# Patient Record
Sex: MALE | Race: OTHER | NOT HISPANIC OR LATINO
[De-identification: names, ages, dates, MRNs, and addresses within clinical notes are randomized per-mention and may not be internally consistent; named-entity substitution may affect disease eponyms.]

---

## 2020-02-07 PROBLEM — Z00.00 ENCOUNTER FOR PREVENTIVE HEALTH EXAMINATION: Status: ACTIVE | Noted: 2020-02-07

## 2020-02-10 ENCOUNTER — APPOINTMENT (OUTPATIENT)
Dept: HEMATOLOGY ONCOLOGY | Facility: CLINIC | Age: 60
End: 2020-02-10
Payer: COMMERCIAL

## 2020-02-10 ENCOUNTER — TRANSCRIPTION ENCOUNTER (OUTPATIENT)
Age: 60
End: 2020-02-10

## 2020-02-10 VITALS
OXYGEN SATURATION: 95 % | RESPIRATION RATE: 18 BRPM | HEIGHT: 67 IN | DIASTOLIC BLOOD PRESSURE: 86 MMHG | TEMPERATURE: 97.6 F | WEIGHT: 215 LBS | BODY MASS INDEX: 33.74 KG/M2 | SYSTOLIC BLOOD PRESSURE: 131 MMHG | HEART RATE: 75 BPM

## 2020-02-10 DIAGNOSIS — Z78.9 OTHER SPECIFIED HEALTH STATUS: ICD-10-CM

## 2020-02-10 DIAGNOSIS — Z87.09 PERSONAL HISTORY OF OTHER DISEASES OF THE RESPIRATORY SYSTEM: ICD-10-CM

## 2020-02-10 DIAGNOSIS — K51.20 ULCERATIVE (CHRONIC) PROCTITIS W/OUT COMPLICATIONS: ICD-10-CM

## 2020-02-10 DIAGNOSIS — R79.89 OTHER SPECIFIED ABNORMAL FINDINGS OF BLOOD CHEMISTRY: ICD-10-CM

## 2020-02-10 DIAGNOSIS — Z86.19 PERSONAL HISTORY OF OTHER INFECTIOUS AND PARASITIC DISEASES: ICD-10-CM

## 2020-02-10 PROCEDURE — 99205 OFFICE O/P NEW HI 60 MIN: CPT

## 2020-02-10 NOTE — ASSESSMENT
[FreeTextEntry1] : This is a very pleasant 60-year-old gentleman who has recently been diagnosed with an erythrocytosis. There are a number of factors that suggests that this may be a reactive process, including the use of testosterone, however, at this time I am unable to rule out a proliferative process.   He may have been keeping his blood counts lowered with his regular donation schedule and therefore making a proliferative process less apparent until now.   I will obtain a CBC with differential and a peripheral blood smear review.  I will obtain a complete SILVIANO-2 mutation analysis panel.   Erythropoietin levels will be checked today.   A full hematological workup will be sent at today's office visit.  He will return in 2 weeks for followup, labs, and further recommendations based on the above workup.\par \par Thank you very much for allowing to participate in this gentleman is medical care.  Should you have any questions or concerns please do not hesitate to call me directly.

## 2020-03-03 ENCOUNTER — APPOINTMENT (OUTPATIENT)
Dept: HEMATOLOGY ONCOLOGY | Facility: CLINIC | Age: 60
End: 2020-03-03
Payer: COMMERCIAL

## 2020-03-03 VITALS
DIASTOLIC BLOOD PRESSURE: 89 MMHG | BODY MASS INDEX: 34.21 KG/M2 | WEIGHT: 218 LBS | OXYGEN SATURATION: 96 % | HEART RATE: 83 BPM | TEMPERATURE: 97.2 F | RESPIRATION RATE: 18 BRPM | HEIGHT: 67 IN | SYSTOLIC BLOOD PRESSURE: 140 MMHG

## 2020-03-03 PROCEDURE — 99214 OFFICE O/P EST MOD 30 MIN: CPT

## 2020-03-03 NOTE — ASSESSMENT
[FreeTextEntry1] : This is a very pleasant 60-year-old gentleman who has recently been diagnosed with an erythrocytosis. There are a number of factors that suggests that this may be a reactive process, including the use of testosterone, however, at this time I am unable to rule out a proliferative process.   He may have been keeping his blood counts lowered with his regular donation schedule and therefore making a proliferative process less apparent until now.   I obtained a CBC with differential and a peripheral blood smear review.  I obtained a complete SILVIANO-2 mutation analysis panel.   Erythropoietin levels were checked .  A full hematological workup was sent.   This was largely unremarkable but he continues to show erythrocytosis.  Given that there is no obvious evidence of a proliferative process that it is reasonable to stop his testosterone treatment to see if this is the causative agent.  Testosterone will now be on hold.  He is scheduled for a colonoscopy later this month.  The positive EDISON will be repeated today and if still positive I have asked him to see Rheum.  He will return in one month for f/u with repeat labs.

## 2020-03-03 NOTE — HISTORY OF PRESENT ILLNESS
[0 - No Distress] : Distress Level: 0 [de-identified] : This is a very pleasant 60 year-old gentleman with the below past medical history who has recently been found to have an erythrocytosis.  This was confirmed on repeat blood testing. He is unaware of a similar prior diagnosis.  He has been regularly donating blood but was told that his counts were too high when he tried to donate about 2 months ago.  He also reports having low testosterone and receiving testosterone injections for her last 2-3 years.   He has now been referred for further hematological evaluation of this.   He denies fevers, chills, loss of appetite or weight, lightheadedness/dizziness, chest pain, chest palpitations, shortness of breath, abdominal pain, nausea, vomiting, diarrhea, signs of blood loss including melena, new lower extremity swelling pain, new rashes/ecchymoses/petechiae.  He does report that he has experienced regular night sweats for the last 9 months, however, they have not been drenching and he has maintained his weight and appetite.  His last colonoscopy was approximately 1 year ago performed at [facility] that he reports showed no specific pathology in that his ulcerative colitis was in remission. [de-identified] : No new complaints.

## 2020-03-31 ENCOUNTER — APPOINTMENT (OUTPATIENT)
Dept: HEMATOLOGY ONCOLOGY | Facility: CLINIC | Age: 60
End: 2020-03-31

## 2020-05-04 PROBLEM — R76.8 ANA POSITIVE: Status: ACTIVE | Noted: 2020-03-03

## 2020-05-04 PROBLEM — D45 PV (POLYCYTHEMIA VERA): Status: ACTIVE | Noted: 2020-02-10

## 2020-05-06 ENCOUNTER — APPOINTMENT (OUTPATIENT)
Dept: HEMATOLOGY ONCOLOGY | Facility: CLINIC | Age: 60
End: 2020-05-06
Payer: COMMERCIAL

## 2020-05-06 VITALS
HEIGHT: 67 IN | WEIGHT: 216 LBS | DIASTOLIC BLOOD PRESSURE: 84 MMHG | OXYGEN SATURATION: 96 % | TEMPERATURE: 97.3 F | SYSTOLIC BLOOD PRESSURE: 137 MMHG | HEART RATE: 76 BPM | BODY MASS INDEX: 33.9 KG/M2 | RESPIRATION RATE: 18 BRPM

## 2020-05-06 DIAGNOSIS — D45 POLYCYTHEMIA VERA: ICD-10-CM

## 2020-05-06 DIAGNOSIS — R76.8 OTHER SPECIFIED ABNORMAL IMMUNOLOGICAL FINDINGS IN SERUM: ICD-10-CM

## 2020-05-06 PROCEDURE — 99214 OFFICE O/P EST MOD 30 MIN: CPT

## 2020-05-06 RX ORDER — ADHESIVE TAPE 3"X 2.3 YD
50 MCG TAPE, NON-MEDICATED TOPICAL
Refills: 0 | Status: ACTIVE | COMMUNITY

## 2020-05-07 NOTE — ASSESSMENT
[FreeTextEntry1] : This is a very pleasant 60-year-old gentleman who has recently been diagnosed with an erythrocytosis. There are a number of factors that suggests that this may be a reactive process, including the use of testosterone, however, at this time I am unable to rule out a proliferative process.   He may have been keeping his blood counts lowered with his regular donation schedule and therefore making a proliferative process less apparent until now.   I obtained a CBC with differential and a peripheral blood smear review.  I obtained a complete SILVIANO-2 mutation analysis panel.   Erythropoietin levels were checked .  A full hematological workup was sent.   This was largely unremarkable but he continues to show erythrocytosis.  Given that there is no obvious evidence of a proliferative process that it is reasonable to stop his testosterone treatment to see if this is the causative agent.  Testosterone has now been on hold.  His H/H has now normalized at today's office visit.  He has been rescheduled for a colonoscopy later next month due to the COVID-19 pandemic.  He will return in 3 months for f/u with repeat labs.

## 2020-05-07 NOTE — HISTORY OF PRESENT ILLNESS
[0 - No Distress] : Distress Level: 0 [de-identified] : This is a very pleasant 60 year-old gentleman with the below past medical history who has recently been found to have an erythrocytosis.  This was confirmed on repeat blood testing. He is unaware of a similar prior diagnosis.  He has been regularly donating blood but was told that his counts were too high when he tried to donate about 2 months ago.  He also reports having low testosterone and receiving testosterone injections for her last 2-3 years.   He has now been referred for further hematological evaluation of this.   He denies fevers, chills, loss of appetite or weight, lightheadedness/dizziness, chest pain, chest palpitations, shortness of breath, abdominal pain, nausea, vomiting, diarrhea, signs of blood loss including melena, new lower extremity swelling pain, new rashes/ecchymoses/petechiae.  He does report that he has experienced regular night sweats for the last 9 months, however, they have not been drenching and he has maintained his weight and appetite.  His last colonoscopy was approximately 1 year ago performed at [facility] that he reports showed no specific pathology in that his ulcerative colitis was in remission. [de-identified] : Here today for a follow up visit. Remicade infusion last week for UC. Due for colonoscopy however due to Covid-19 it has been rescheduled for June. Reports right shoulder pain and arm weakness, mostly anterior, occasionally posteriorly that radiates down his arm. Occasional sharp pain to his right foot, medial to lateral.

## 2020-05-07 NOTE — REVIEW OF SYSTEMS
[Fatigue] : fatigue [Negative] : Musculoskeletal [FreeTextEntry3] : glasses [de-identified] : gretchenies allen [FreeTextEntry1] : allegra prn- seasonal allergies.

## 2020-08-11 ENCOUNTER — APPOINTMENT (OUTPATIENT)
Dept: HEMATOLOGY ONCOLOGY | Facility: CLINIC | Age: 60
End: 2020-08-11
Payer: COMMERCIAL

## 2020-08-11 VITALS
BODY MASS INDEX: 34.46 KG/M2 | OXYGEN SATURATION: 95 % | RESPIRATION RATE: 18 BRPM | WEIGHT: 219.56 LBS | DIASTOLIC BLOOD PRESSURE: 83 MMHG | SYSTOLIC BLOOD PRESSURE: 148 MMHG | TEMPERATURE: 98.2 F | HEART RATE: 95 BPM | HEIGHT: 67 IN

## 2020-08-11 DIAGNOSIS — D75.1 SECONDARY POLYCYTHEMIA: ICD-10-CM

## 2020-08-11 PROCEDURE — 99213 OFFICE O/P EST LOW 20 MIN: CPT

## 2020-08-11 RX ORDER — FLUTICASONE PROPION/SALMETEROL 250-50 MCG
250-50 BLISTER, WITH INHALATION DEVICE INHALATION
Refills: 0 | Status: ACTIVE | COMMUNITY

## 2020-08-11 RX ORDER — INFLIXIMAB 100 MG/10ML
100 INJECTION, POWDER, LYOPHILIZED, FOR SOLUTION INTRAVENOUS
Refills: 0 | Status: ACTIVE | COMMUNITY

## 2020-08-11 NOTE — HISTORY OF PRESENT ILLNESS
[0 - No Distress] : Distress Level: 0 [de-identified] : This is a very pleasant 60 year-old gentleman with the below past medical history who has recently been found to have an erythrocytosis.  This was confirmed on repeat blood testing. He is unaware of a similar prior diagnosis.  He has been regularly donating blood but was told that his counts were too high when he tried to donate about 2 months ago.  He also reports having low testosterone and receiving testosterone injections for her last 2-3 years.   He has now been referred for further hematological evaluation of this.   He denies fevers, chills, loss of appetite or weight, lightheadedness/dizziness, chest pain, chest palpitations, shortness of breath, abdominal pain, nausea, vomiting, diarrhea, signs of blood loss including melena, new lower extremity swelling pain, new rashes/ecchymoses/petechiae.  He does report that he has experienced regular night sweats for the last 9 months, however, they have not been drenching and he has maintained his weight and appetite.  His last colonoscopy was approximately 1 year ago performed at [facility] that he reports showed no specific pathology in that his ulcerative colitis was in remission. 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 He recently discontinued testosterone and today's hemoglobin has normalized. He reports being somewhat more fatigued after discontinuing testosterone. He denies rash, fever, infections, bleeding, bruising, nausea,vomiting,cough, SOB, chest pain, change in BM, headache or dizziness.

## 2020-08-11 NOTE — REVIEW OF SYSTEMS
[Fatigue] : fatigue [Negative] : Heme/Lymph [FreeTextEntry2] : More since discontinuing testosterone [FreeTextEntry3] : glasses [FreeTextEntry1] : allegra prn- seasonal allergies.

## 2020-08-11 NOTE — ASSESSMENT
[FreeTextEntry1] : This is a very pleasant 60-year-old gentleman who has recently been diagnosed with an erythrocytosis. There are a number of factors that suggests that this may be a reactive process, including the use of testosterone, however, at this time I am unable to rule out a proliferative process.   He may have been keeping his blood counts lowered with his regular donation schedule and therefore making a proliferative process less apparent until now.   I obtained a CBC with differential and a peripheral blood smear review.  I obtained a complete SILVIANO-2 mutation analysis panel.   Erythropoietin levels were checked .  A full hematological workup was sent.   This was largely unremarkable but he continues to show erythrocytosis.  Given that there is no obvious evidence of a proliferative process that it is reasonable to stop his testosterone treatment to see if this is the causative agent.  Testosterone has now been on hold.  His H/H continues to be normalized at today's office visit. He inquired about the reduction  of energy since discontinuing testosterone and inquired about an alternative agent. We discussed that this question was best discussed with his urologist.\par continue Remicade per GI for ulcerative colitis\par History of present illness, review of systems, physical exam and treatment plan reviewed with .\par Office visit in 12 weeks or prn for new or worsening symptoms.

## 2020-08-11 NOTE — PHYSICAL EXAM
[Restricted in physically strenuous activity but ambulatory and able to carry out work of a light or sedentary nature] : Status 1- Restricted in physically strenuous activity but ambulatory and able to carry out work of a light or sedentary nature, e.g., light house work, office work [Normal] : normal spine exam without palpable tenderness, no kyphosis or scoliosis

## 2020-11-03 ENCOUNTER — APPOINTMENT (OUTPATIENT)
Dept: HEMATOLOGY ONCOLOGY | Facility: CLINIC | Age: 60
End: 2020-11-03

## 2022-07-27 ENCOUNTER — APPOINTMENT (OUTPATIENT)
Dept: CARDIOLOGY | Facility: CLINIC | Age: 62
End: 2022-07-27

## 2022-07-27 VITALS
HEART RATE: 96 BPM | DIASTOLIC BLOOD PRESSURE: 79 MMHG | BODY MASS INDEX: 33.74 KG/M2 | WEIGHT: 215 LBS | SYSTOLIC BLOOD PRESSURE: 137 MMHG | OXYGEN SATURATION: 98 % | HEIGHT: 67 IN

## 2022-07-27 DIAGNOSIS — Z86.39 PERSONAL HISTORY OF OTHER ENDOCRINE, NUTRITIONAL AND METABOLIC DISEASE: ICD-10-CM

## 2022-07-27 DIAGNOSIS — Z87.438 PERSONAL HISTORY OF OTHER DISEASES OF MALE GENITAL ORGANS: ICD-10-CM

## 2022-07-27 DIAGNOSIS — Z86.19 PERSONAL HISTORY OF OTHER INFECTIOUS AND PARASITIC DISEASES: ICD-10-CM

## 2022-07-27 DIAGNOSIS — Z83.79 FAMILY HISTORY OF OTHER DISEASES OF THE DIGESTIVE SYSTEM: ICD-10-CM

## 2022-07-27 DIAGNOSIS — I77.9 DISORDER OF ARTERIES AND ARTERIOLES, UNSPECIFIED: ICD-10-CM

## 2022-07-27 PROCEDURE — 99204 OFFICE O/P NEW MOD 45 MIN: CPT | Mod: 25

## 2022-07-27 PROCEDURE — 93000 ELECTROCARDIOGRAM COMPLETE: CPT

## 2022-07-27 RX ORDER — NITROGLYCERIN 0.4 MG/1
0.4 TABLET SUBLINGUAL
Qty: 30 | Refills: 3 | Status: ACTIVE | COMMUNITY
Start: 2022-07-27 | End: 1900-01-01

## 2022-07-28 PROBLEM — Z86.19 HISTORY OF CLOSTRIDIOIDES DIFFICILE COLITIS: Status: RESOLVED | Noted: 2022-07-28 | Resolved: 2022-07-28

## 2022-07-28 PROBLEM — Z87.438 HISTORY OF ERECTILE DYSFUNCTION: Status: RESOLVED | Noted: 2022-07-28 | Resolved: 2022-07-28

## 2022-07-28 PROBLEM — Z86.39 HISTORY OF VITAMIN D DEFICIENCY: Status: RESOLVED | Noted: 2022-07-28 | Resolved: 2022-07-28

## 2022-07-28 PROBLEM — Z83.79 FAMILY HISTORY OF HEPATIC CIRRHOSIS: Status: ACTIVE | Noted: 2022-07-28

## 2022-07-29 ENCOUNTER — NON-APPOINTMENT (OUTPATIENT)
Age: 62
End: 2022-07-29

## 2022-07-29 PROBLEM — I77.9 CAROTID ARTERY DISEASE: Status: RESOLVED | Noted: 2022-07-29 | Resolved: 2022-07-29

## 2022-07-29 RX ORDER — CHROMIUM 200 MCG
TABLET ORAL
Refills: 0 | Status: ACTIVE | COMMUNITY

## 2022-07-29 RX ORDER — SILDENAFIL 20 MG/1
TABLET ORAL
Refills: 0 | Status: ACTIVE | COMMUNITY

## 2022-07-29 NOTE — PHYSICAL EXAM
[Normal Conjunctiva] : normal conjunctiva [Normal S1, S2] : normal S1, S2 [Clear Lung Fields] : clear lung fields [Soft] : abdomen soft [Non Tender] : non-tender [Normal Bowel Sounds] : normal bowel sounds [Normal Gait] : normal gait [No Rash] : no rash [No Focal Deficits] : no focal deficits [de-identified] : appears in no distress lying flat [de-identified] : normocephalic [de-identified] : no carotid bruit. No jugular venous distention [de-identified] : no murmur. No gallop. No diastolic sounds. [de-identified] : no peripheral edema. Dorsalis pedis pulse +2 bilaterally. Feet warm and well-perfused. No ulcerations. [de-identified] : full range of motion [de-identified] : pleasant

## 2022-07-29 NOTE — HISTORY OF PRESENT ILLNESS
[FreeTextEntry1] : 62-year-old man\par Cardiology consultation requested because of chest pain\par \par Caio  Has no known heart disease. There is no history of myocardial infarction or congestive heart failure. An echocardiographic study performed 6-7 years ago was reportedly normal. That study is not available at this time.\par \par In 3/22 Caio began to experience exertional chest pain when walking from the train with associated burning sensation in the throat and right arm discomfort. An ENT evaluation led to a diagnosis of reflux. On a personal was prescribed without any significant benefit. A carotid ultrasound study showed carotid plaque left greater than right without any hemodynamic stenosis.\par \par The chest discomfort has persisted occurring only with exercise and alleviated by rest. There has never ever been any symptoms at rest.\par \par There is no history of diabetes hypertension or hyperlipidemia. There is no family history of a myocardial infarction or sudden death.  He smoked a rare cigarette in the past stopping 20 years ago. There is a long history of obesity\par \par Caio presents today for cardiovascular evaluation

## 2022-07-29 NOTE — DISCUSSION/SUMMARY
[FreeTextEntry1] : Chest pain\par The working diagnosis is angina secondary to atherosclerotic heart disease. The history is compelling for this diagnosis. However, the resting 7/27/27 electrocardiogram is normal showing no evidence of myocardial ischemia or infarction. The patient does not have multiple risk factors for the development of atherosclerotic disease.  The differential diagnosis would include musculoskeletal/noncardiac chest pain. The lack of response to omeprazole and pantoprazole  argue  against gastroesophageal/laryngeal reflux as a cause for the patient's symptoms.\par \par I  have recommended  the following :\par a. Beta blockers  (atenolol 25 mg/day) with further dose adjustment dictated by tolerance and response\par b. Aspirin 81 mg/day\par c. Nitroglycerin 1/150  sublingual p.r.n.\par d. Noninvasive cardiac evaluation to include\par   1. echocardiogram\par   2. CT coronary angiogram\par e. Coronary arteriography/cardiac catheterization dependent on the results of the above studies and the patient's clinical course\par \par \par Obesity\par Obesity exacerbates  Caio's  cardiovascular issues. Today Mr. Ko is 5 feet 7 inches tall and weighs 215 pounds. Diet exercise and weight loss are advised \par \par \par The diagnosis, prognosis, risks, options and alternatives were explained at length to the patient. All questions were answered. Issues discussed included chest pain atherosclerotic  heart disease noninvasive cardiac studies obesity coronary arteriography diet and exercise

## 2022-09-04 ENCOUNTER — LABORATORY RESULT (OUTPATIENT)
Age: 62
End: 2022-09-04

## 2022-09-06 ENCOUNTER — APPOINTMENT (OUTPATIENT)
Dept: CARDIOLOGY | Facility: CLINIC | Age: 62
End: 2022-09-06

## 2022-09-06 ENCOUNTER — NON-APPOINTMENT (OUTPATIENT)
Age: 62
End: 2022-09-06

## 2022-09-06 PROCEDURE — 93306 TTE W/DOPPLER COMPLETE: CPT

## 2022-09-07 ENCOUNTER — RESULT REVIEW (OUTPATIENT)
Age: 62
End: 2022-09-07

## 2022-09-11 DIAGNOSIS — Z86.79 PERSONAL HISTORY OF OTHER DISEASES OF THE CIRCULATORY SYSTEM: ICD-10-CM

## 2022-09-14 ENCOUNTER — RESULT REVIEW (OUTPATIENT)
Age: 62
End: 2022-09-14

## 2022-09-18 ENCOUNTER — NON-APPOINTMENT (OUTPATIENT)
Age: 62
End: 2022-09-18

## 2022-09-18 DIAGNOSIS — R73.9 HYPERGLYCEMIA, UNSPECIFIED: ICD-10-CM

## 2022-09-20 ENCOUNTER — APPOINTMENT (OUTPATIENT)
Dept: CARDIOLOGY | Facility: CLINIC | Age: 62
End: 2022-09-20

## 2022-09-20 VITALS
WEIGHT: 214 LBS | SYSTOLIC BLOOD PRESSURE: 140 MMHG | HEIGHT: 67 IN | HEART RATE: 85 BPM | BODY MASS INDEX: 33.59 KG/M2 | OXYGEN SATURATION: 98 % | DIASTOLIC BLOOD PRESSURE: 72 MMHG

## 2022-09-20 PROCEDURE — 36415 COLL VENOUS BLD VENIPUNCTURE: CPT

## 2022-09-20 PROCEDURE — 99214 OFFICE O/P EST MOD 30 MIN: CPT | Mod: 25

## 2022-09-20 NOTE — PHYSICAL EXAM
[Normal Conjunctiva] : normal conjunctiva [Normal S1, S2] : normal S1, S2 [Clear Lung Fields] : clear lung fields [Soft] : abdomen soft [Non Tender] : non-tender [Normal Bowel Sounds] : normal bowel sounds [Normal Gait] : normal gait [No Rash] : no rash [No Focal Deficits] : no focal deficits [de-identified] : appears in no distress lying flat [de-identified] : normocephalic [de-identified] : no carotid bruit. No jugular venous distention [de-identified] : no murmur. No gallop. No diastolic sounds. [de-identified] : full range of motion [de-identified] : no peripheral edema. Dorsalis pedis pulse +2 bilaterally. Feet warm and well-perfused. No ulcerations. [de-identified] : pleasant

## 2022-09-20 NOTE — DISCUSSION/SUMMARY
[FreeTextEntry1] : Atherosclerotic heart disease\par The working diagnosis is multivessel atherosclerotic heart disease with normal left ventricular systolic function and angina despite medical therapy.   The 9/22 CT coronary angiogram demonstrated  a less than 25% left main stenosis. The LAD proximal 50-69% mid  50-69% left circumflex proximal 50-69% large OM1 patent RCA 30-69%. were seen  FFR measurements revealed compromised coronary blood flow involving the left anterior descending artery and circumflex. . The 9/22 echocardiogram revealed normal left ventricular end-diastolic dimension of 5.5 cm the left ventricle ejection fraction of 67%. In view of the patient's symptoms coronary arteriography is indicated to assess treatment alternatives with revascularization  procedures. Measures to control modifiable risk factors for the development of atherosclerotic disease will be important in long-term management.\par \par I have recommended following\par a. Risk factor modification\par b. Coronary arteriography as discussed above\par c. Cardiac rehabilitation program to follow the above\par \par \par \par \par Hyperlipidemia\par Hyperlipidemia represents a risk factor for progressive atherosclerotic heart disease. In the setting of known coronary disease the target LDL level is about 70. Prior to medical intervention in 9/22 the serum cholesterol level was 204 HDL 33 triglycerides 249 and . The dose of rosuvastatin may be increased if required to obtain optimal levels. Nonpharmacological therapy, specifically diet exercise and weight loss are emphasized as major aspects of treatment.\par \par I have recommended following \par a. Low-fat low-cholesterol low caloric diet. Regular aerobic exercise and weight loss\par b. Continue the present medical regimen\par c. Target LDL level to about 70 as discussed above\par d. Increase rosuvastatin dose if required to obtain optimal levels as noted above.\par \par \par \par Obesity\par Obesity exacerbates  Caio's  cardiovascular issues. Today Mr. Ko is 5 feet 7 inches tall and weighs 214  pounds. Diet exercise and weight loss are advised \par \par \par \par The diagnosis, prognosis, risks, options and alternatives were explained at length to the patient. All questions were answered. Issues discussed included  coronary arteriography revascularization procedures  chest pain atherosclerotic  heart disease  hyperlipidemia noninvasive cardiac studies obesity coronary arteriography diet and exercise.\par \par Counseling and will coordination of care\par Time was a significant factor for this patient encounter. Total time spent with the patient was 30 minutes. Greater than 50% of the time was devoted to counseling and/or  coordination of care

## 2022-09-20 NOTE — HISTORY OF PRESENT ILLNESS
[FreeTextEntry1] : 62-year-old man\par Routine followup\par Caio continues to experience throat discomfort and vague chest pain. He has curtailed his activities. Nitroglycerin provides some benefit with regard to the throat symptoms.

## 2022-09-24 ENCOUNTER — RESULT REVIEW (OUTPATIENT)
Age: 62
End: 2022-09-24

## 2022-09-25 LAB
ANION GAP SERPL CALC-SCNC: 15 MMOL/L
BUN SERPL-MCNC: 14 MG/DL
CALCIUM SERPL-MCNC: 9.5 MG/DL
CHLORIDE SERPL-SCNC: 105 MMOL/L
CHOLEST SERPL-MCNC: 144 MG/DL
CO2 SERPL-SCNC: 22 MMOL/L
CREAT SERPL-MCNC: 1.08 MG/DL
EGFR: 79 ML/MIN/1.73M2
GLUCOSE SERPL-MCNC: 131 MG/DL
HDLC SERPL-MCNC: 36 MG/DL
INR PPP: 0.97 RATIO
LDLC SERPL CALC-MCNC: 60 MG/DL
NONHDLC SERPL-MCNC: 107 MG/DL
POTASSIUM SERPL-SCNC: 4.5 MMOL/L
PT BLD: 11.3 SEC
SODIUM SERPL-SCNC: 143 MMOL/L
TRIGL SERPL-MCNC: 238 MG/DL

## 2022-10-03 VITALS
DIASTOLIC BLOOD PRESSURE: 70 MMHG | SYSTOLIC BLOOD PRESSURE: 124 MMHG | RESPIRATION RATE: 16 BRPM | OXYGEN SATURATION: 98 % | HEART RATE: 60 BPM | TEMPERATURE: 98 F | WEIGHT: 215.39 LBS

## 2022-10-03 RX ORDER — CHLORHEXIDINE GLUCONATE 213 G/1000ML
1 SOLUTION TOPICAL ONCE
Refills: 0 | Status: DISCONTINUED | OUTPATIENT
Start: 2022-10-07 | End: 2022-10-08

## 2022-10-03 NOTE — H&P ADULT - ASSESSMENT
61 y/o obese M with PMH of HLD, asthma, ulcerative colitis (on Remacaid), CAD s/p cardiac cath @ [facility] 9/28/22: 2 V CAD, pLAD: 75% CTFFR +ve , pLCx: % who now presents for staged PCI    Denies bleeding, hematuria, hematochezia, melena. Aspirin 325mg and Plavix 600mg ordered pre-cath.   Euvolemic, normal EF. NS @ 75cc/hour x 4h ordered pre-cath    Mallampati Class   ASA Class II  Pt is a suitable candidate for moderate sedation.   Risks & benefits of procedure and alternative therapy have been explained to the patient including but not limited to: allergic reaction, bleeding w/possible need for blood transfusion, infection, renal and vascular compromise, limb damage, arrhythmia, stroke, vessel dissection/perforation, Myocardial infarction, emergent CABG. Informed consent obtained and in chart.   63 y/o obese M with PMH of HLD, asthma, ulcerative colitis (on Remacaid), CAD s/p cardiac cath @ [facility] 9/28/22: 2 V CAD, pLAD: 75% CTFFR +ve , pLCx: % who now presents for staged PCI    Denies bleeding, hematuria, hematochezia, melena. Asa 81mg and plavix 600mg precath  Euvolemic, normal EF. NS 250cc bolus then NS @ 75cc/h x 2h  A1C 6.6, no hx of DM    Mallampati Class II  ASA Class II  Pt is a suitable candidate for moderate sedation.   Risks & benefits of procedure and alternative therapy have been explained to the patient including but not limited to: allergic reaction, bleeding w/possible need for blood transfusion, infection, renal and vascular compromise, limb damage, arrhythmia, stroke, vessel dissection/perforation, Myocardial infarction, emergent CABG. Informed consent obtained and in chart.   61 y/o obese M with PMH of HLD, asthma, ulcerative colitis (on Remacaid), CAD s/p cardiac cath @ [facility] 9/28/22: 2 V CAD, pLAD: 75% CTFFR +ve , pLCx: % who now presents for staged PCI    Denies bleeding, hematuria, hematochezia, melena. Asa 81mg and plavix 600mg precath  Euvolemic, normal EF. NS 250cc bolus then NS @ 75cc/h x 2h  A1C 6.6, no hx of DM. Pt educated on the importance of diet and exercise Instructed pt to discuss this w/ his outpatient provider as this is a risk factor for CAD    Mallampati Class II  ASA Class II  Pt is a suitable candidate for moderate sedation.   Risks & benefits of procedure and alternative therapy have been explained to the patient including but not limited to: allergic reaction, bleeding w/possible need for blood transfusion, infection, renal and vascular compromise, limb damage, arrhythmia, stroke, vessel dissection/perforation, Myocardial infarction, emergent CABG. Informed consent obtained and in chart.

## 2022-10-03 NOTE — H&P ADULT - HISTORY OF PRESENT ILLNESS
Cardiologist: Dr. Bailey  Pharmacy:  COVID:  Escort:     63 y/o obese M with PMH of HLD, asthma, ulcerative colitis (on Remacaid), CAD s/p cardiac cath @ [facility] 9/28/22: 2 V CAD, pLAD: 75% CTFFR +ve , pLCx: % prox stenosis, fills via Lt->Lt and Rt->Lt collaterals , LVEF: 55%, EDP 20mmHg  who now presents for staged PCI of complex lesions at [facility]. Pt had initially presented to [facility] with CC of chest pain with abnormal CCTA. Patient reports that since March 2022, he noticed radiating chest discomfort to jaw and arm with mild exertion (ie walking to subway or train station), SOB with hills, and dizziness with positional changes that have become more frequent in the past two months. He does note that he was initially treated for GERD but symptoms did not improve, he sometimes will notice "throat/jaw pain" at rest. Nitroglycerin provides some benefit with regard to the throat symptoms. Pt. denies any palpitation, N/V, LE edema, PND/orthopnea and syncope. In light of patients risk factors, CCS class 3 sx and known residual disease; pt. now presents for staged PCI.       CCTA 9/14/22:  calcium score 461, LM < 25% , LAD proximal 50-69% , mLAD 50-69%  pLCX 70-90% OM1 normal, RCA 30-69%.  CT-FFR is positive for LAD and LCx.     Echo 9/6/22: mild mitral regurgitation normal pulmonary systolic pressure 30 mmHg. Mild LVH. LVEF 67%.     Carotid US 2022: carotid ultrasound plaque at carotid bulb and bifurcation. No anatomic hemodynamic stenosis     EKG 9/28/22: SB @ 58bpm; no acute ischemia    Cardiologist: Dr. Bailey  Pharmacy: [facility] (in [facility]s)  COVID: PCR 10/4/22 neg in chart    61 y/o obese M with PMH of HLD, asthma, ulcerative colitis (on Remacaid), CAD s/p cardiac cath @ [facility] 9/28/22: 2 V CAD, pLAD: 75% CTFFR +ve , pLCx: % prox stenosis, fills via Lt->Lt and Rt->Lt collaterals , LVEF: 55%, EDP 20mmHg  who now presents for staged PCI of complex lesions at [facility]. Pt had initially presented to [facility] with CC of chest pain with abnormal CCTA. Patient reports that since March 2022, he noticed radiating chest discomfort to jaw and arm with mild exertion (ie walking to subway or train station), SOB with hills, and dizziness with positional changes that have become more frequent in the past two months. He does note that he was initially treated for GERD but symptoms did not improve, he sometimes will notice "throat/jaw pain" at rest. Nitroglycerin provides some benefit with regard to the throat symptoms. Pt. denies any palpitation, N/V, LE edema, PND/orthopnea and syncope. In light of patients risk factors, CCS class 3 sx and known residual disease; pt. now presents for staged PCI.       CCTA 9/14/22:  calcium score 461, LM < 25% , LAD proximal 50-69% , mLAD 50-69%  pLCX 70-90% OM1 normal, RCA 30-69%.  CT-FFR is positive for LAD and LCx.     Echo 9/6/22: mild mitral regurgitation normal pulmonary systolic pressure 30 mmHg. Mild LVH. LVEF 67%.     Carotid US 2022: carotid ultrasound plaque at carotid bulb and bifurcation. No anatomic hemodynamic stenosis     EKG 9/28/22: SB @ 58bpm; no acute ischemia

## 2022-10-03 NOTE — H&P ADULT - CARDIOVASCULAR
normal/regular rate and rhythm/S1 S2 present/no gallops/no rub/no murmur/no JVD/no pedal edema/vascular

## 2022-10-03 NOTE — H&P ADULT - NSICDXPASTMEDICALHX_GEN_ALL_CORE_FT
PAST MEDICAL HISTORY:  Asthma     CAD (coronary artery disease)     Hyperlipidemia     Ulcerative colitis

## 2022-10-07 ENCOUNTER — INPATIENT (INPATIENT)
Facility: HOSPITAL | Age: 62
LOS: 0 days | Discharge: HOME | DRG: 247 | End: 2022-10-08
Attending: INTERNAL MEDICINE | Admitting: INTERNAL MEDICINE
Payer: COMMERCIAL

## 2022-10-07 LAB
A1C WITH ESTIMATED AVERAGE GLUCOSE RESULT: 6.6 % — HIGH (ref 4–5.6)
ALBUMIN SERPL ELPH-MCNC: 4.3 G/DL — SIGNIFICANT CHANGE UP (ref 3.3–5)
ALP SERPL-CCNC: 81 U/L — SIGNIFICANT CHANGE UP (ref 40–120)
ALT FLD-CCNC: 20 U/L — SIGNIFICANT CHANGE UP (ref 10–45)
ANION GAP SERPL CALC-SCNC: 12 MMOL/L — SIGNIFICANT CHANGE UP (ref 5–17)
APTT BLD: 31.4 SEC — SIGNIFICANT CHANGE UP (ref 27.5–35.5)
AST SERPL-CCNC: 19 U/L — SIGNIFICANT CHANGE UP (ref 10–40)
BASOPHILS # BLD AUTO: 0.06 K/UL — SIGNIFICANT CHANGE UP (ref 0–0.2)
BASOPHILS NFR BLD AUTO: 0.6 % — SIGNIFICANT CHANGE UP (ref 0–2)
BILIRUB SERPL-MCNC: 0.6 MG/DL — SIGNIFICANT CHANGE UP (ref 0.2–1.2)
BUN SERPL-MCNC: 16 MG/DL — SIGNIFICANT CHANGE UP (ref 7–23)
CALCIUM SERPL-MCNC: 9.3 MG/DL — SIGNIFICANT CHANGE UP (ref 8.4–10.5)
CHLORIDE SERPL-SCNC: 105 MMOL/L — SIGNIFICANT CHANGE UP (ref 96–108)
CHOLEST SERPL-MCNC: 126 MG/DL — SIGNIFICANT CHANGE UP
CO2 SERPL-SCNC: 27 MMOL/L — SIGNIFICANT CHANGE UP (ref 22–31)
CREAT SERPL-MCNC: 1.2 MG/DL — SIGNIFICANT CHANGE UP (ref 0.5–1.3)
EGFR: 69 ML/MIN/1.73M2 — SIGNIFICANT CHANGE UP
EOSINOPHIL # BLD AUTO: 0.22 K/UL — SIGNIFICANT CHANGE UP (ref 0–0.5)
EOSINOPHIL NFR BLD AUTO: 2.2 % — SIGNIFICANT CHANGE UP (ref 0–6)
ESTIMATED AVERAGE GLUCOSE: 143 MG/DL — HIGH (ref 68–114)
GLUCOSE SERPL-MCNC: 114 MG/DL — HIGH (ref 70–99)
HCT VFR BLD CALC: 49.2 % — SIGNIFICANT CHANGE UP (ref 39–50)
HDLC SERPL-MCNC: 35 MG/DL — LOW
HGB BLD-MCNC: 15.5 G/DL — SIGNIFICANT CHANGE UP (ref 13–17)
IMM GRANULOCYTES NFR BLD AUTO: 0.6 % — SIGNIFICANT CHANGE UP (ref 0–0.9)
INR BLD: 1.08 — SIGNIFICANT CHANGE UP (ref 0.88–1.16)
ISTAT ACTK (ACTIVATED CLOTTING TIME KAOLIN): 266 SEC — HIGH (ref 74–137)
LIPID PNL WITH DIRECT LDL SERPL: 64 MG/DL — SIGNIFICANT CHANGE UP
LYMPHOCYTES # BLD AUTO: 3 K/UL — SIGNIFICANT CHANGE UP (ref 1–3.3)
LYMPHOCYTES # BLD AUTO: 30.5 % — SIGNIFICANT CHANGE UP (ref 13–44)
MCHC RBC-ENTMCNC: 25.4 PG — LOW (ref 27–34)
MCHC RBC-ENTMCNC: 31.5 GM/DL — LOW (ref 32–36)
MCV RBC AUTO: 80.7 FL — SIGNIFICANT CHANGE UP (ref 80–100)
MONOCYTES # BLD AUTO: 0.99 K/UL — HIGH (ref 0–0.9)
MONOCYTES NFR BLD AUTO: 10.1 % — SIGNIFICANT CHANGE UP (ref 2–14)
NEUTROPHILS # BLD AUTO: 5.49 K/UL — SIGNIFICANT CHANGE UP (ref 1.8–7.4)
NEUTROPHILS NFR BLD AUTO: 56 % — SIGNIFICANT CHANGE UP (ref 43–77)
NON HDL CHOLESTEROL: 91 MG/DL — SIGNIFICANT CHANGE UP
NRBC # BLD: 0 /100 WBCS — SIGNIFICANT CHANGE UP (ref 0–0)
PLATELET # BLD AUTO: 284 K/UL — SIGNIFICANT CHANGE UP (ref 150–400)
POTASSIUM SERPL-MCNC: 4.5 MMOL/L — SIGNIFICANT CHANGE UP (ref 3.5–5.3)
POTASSIUM SERPL-SCNC: 4.5 MMOL/L — SIGNIFICANT CHANGE UP (ref 3.5–5.3)
PROT SERPL-MCNC: 7.8 G/DL — SIGNIFICANT CHANGE UP (ref 6–8.3)
PROTHROM AB SERPL-ACNC: 12.9 SEC — SIGNIFICANT CHANGE UP (ref 10.5–13.4)
RBC # BLD: 6.1 M/UL — HIGH (ref 4.2–5.8)
RBC # FLD: 15.3 % — HIGH (ref 10.3–14.5)
SODIUM SERPL-SCNC: 144 MMOL/L — SIGNIFICANT CHANGE UP (ref 135–145)
TRIGL SERPL-MCNC: 136 MG/DL — SIGNIFICANT CHANGE UP
WBC # BLD: 9.82 K/UL — SIGNIFICANT CHANGE UP (ref 3.8–10.5)
WBC # FLD AUTO: 9.82 K/UL — SIGNIFICANT CHANGE UP (ref 3.8–10.5)

## 2022-10-07 PROCEDURE — 93571 IV DOP VEL&/PRESS C FLO 1ST: CPT | Mod: 26,LD,52

## 2022-10-07 PROCEDURE — 92928 PRQ TCAT PLMT NTRAC ST 1 LES: CPT | Mod: LC

## 2022-10-07 PROCEDURE — 92933 PRQ TRLML C ATHRC ST ANGIOP1: CPT | Mod: LD

## 2022-10-07 PROCEDURE — 92978 ENDOLUMINL IVUS OCT C 1ST: CPT | Mod: 26,LD

## 2022-10-07 PROCEDURE — 93010 ELECTROCARDIOGRAM REPORT: CPT

## 2022-10-07 RX ORDER — ALBUTEROL 90 UG/1
2 AEROSOL, METERED ORAL ONCE
Refills: 0 | Status: DISCONTINUED | OUTPATIENT
Start: 2022-10-07 | End: 2022-10-08

## 2022-10-07 RX ORDER — CHOLECALCIFEROL (VITAMIN D3) 125 MCG
1 CAPSULE ORAL
Qty: 0 | Refills: 0 | DISCHARGE

## 2022-10-07 RX ORDER — ALBUTEROL 90 UG/1
2 AEROSOL, METERED ORAL
Qty: 0 | Refills: 0 | DISCHARGE

## 2022-10-07 RX ORDER — INFLIXIMAB-DYYB 120 MG/ML
0 INJECTION SUBCUTANEOUS
Qty: 0 | Refills: 0 | DISCHARGE

## 2022-10-07 RX ORDER — PANTOPRAZOLE SODIUM 20 MG/1
1 TABLET, DELAYED RELEASE ORAL
Qty: 0 | Refills: 0 | DISCHARGE

## 2022-10-07 RX ORDER — CLOPIDOGREL BISULFATE 75 MG/1
75 TABLET, FILM COATED ORAL ONCE
Refills: 0 | Status: COMPLETED | OUTPATIENT
Start: 2022-10-08 | End: 2022-10-08

## 2022-10-07 RX ORDER — NITROGLYCERIN 6.5 MG
1 CAPSULE, EXTENDED RELEASE ORAL
Qty: 0 | Refills: 0 | DISCHARGE

## 2022-10-07 RX ORDER — ASPIRIN/CALCIUM CARB/MAGNESIUM 324 MG
81 TABLET ORAL ONCE
Refills: 0 | Status: DISCONTINUED | OUTPATIENT
Start: 2022-10-07 | End: 2022-10-07

## 2022-10-07 RX ORDER — ATORVASTATIN CALCIUM 80 MG/1
80 TABLET, FILM COATED ORAL ONCE
Refills: 0 | Status: COMPLETED | OUTPATIENT
Start: 2022-10-07 | End: 2022-10-07

## 2022-10-07 RX ORDER — ATENOLOL 25 MG/1
1 TABLET ORAL
Qty: 0 | Refills: 0 | DISCHARGE

## 2022-10-07 RX ORDER — SODIUM CHLORIDE 9 MG/ML
250 INJECTION INTRAMUSCULAR; INTRAVENOUS; SUBCUTANEOUS ONCE
Refills: 0 | Status: COMPLETED | OUTPATIENT
Start: 2022-10-07 | End: 2022-10-07

## 2022-10-07 RX ORDER — SODIUM CHLORIDE 9 MG/ML
500 INJECTION INTRAMUSCULAR; INTRAVENOUS; SUBCUTANEOUS
Refills: 0 | Status: DISCONTINUED | OUTPATIENT
Start: 2022-10-07 | End: 2022-10-07

## 2022-10-07 RX ORDER — CLOPIDOGREL BISULFATE 75 MG/1
600 TABLET, FILM COATED ORAL ONCE
Refills: 0 | Status: COMPLETED | OUTPATIENT
Start: 2022-10-07 | End: 2022-10-07

## 2022-10-07 RX ORDER — FEXOFENADINE HCL 30 MG
1 TABLET ORAL
Qty: 0 | Refills: 0 | DISCHARGE

## 2022-10-07 RX ORDER — FOLIC ACID 0.8 MG
1 TABLET ORAL
Qty: 0 | Refills: 0 | DISCHARGE

## 2022-10-07 RX ORDER — ASPIRIN/CALCIUM CARB/MAGNESIUM 324 MG
81 TABLET ORAL ONCE
Refills: 0 | Status: COMPLETED | OUTPATIENT
Start: 2022-10-08 | End: 2022-10-08

## 2022-10-07 RX ORDER — ROSUVASTATIN CALCIUM 5 MG/1
1 TABLET ORAL
Qty: 0 | Refills: 0 | DISCHARGE

## 2022-10-07 RX ORDER — FLUTICASONE PROPIONATE AND SALMETEROL 50; 250 UG/1; UG/1
1 POWDER ORAL; RESPIRATORY (INHALATION)
Qty: 0 | Refills: 0 | DISCHARGE

## 2022-10-07 RX ORDER — PANTOPRAZOLE SODIUM 20 MG/1
40 TABLET, DELAYED RELEASE ORAL ONCE
Refills: 0 | Status: COMPLETED | OUTPATIENT
Start: 2022-10-07 | End: 2022-10-07

## 2022-10-07 RX ORDER — SODIUM CHLORIDE 9 MG/ML
500 INJECTION INTRAMUSCULAR; INTRAVENOUS; SUBCUTANEOUS
Refills: 0 | Status: DISCONTINUED | OUTPATIENT
Start: 2022-10-07 | End: 2022-10-08

## 2022-10-07 RX ORDER — ASPIRIN/CALCIUM CARB/MAGNESIUM 324 MG
81 TABLET ORAL ONCE
Refills: 0 | Status: COMPLETED | OUTPATIENT
Start: 2022-10-07 | End: 2022-10-07

## 2022-10-07 RX ORDER — ATENOLOL 25 MG/1
25 TABLET ORAL ONCE
Refills: 0 | Status: COMPLETED | OUTPATIENT
Start: 2022-10-07 | End: 2022-10-07

## 2022-10-07 RX ADMIN — SODIUM CHLORIDE 75 MILLILITER(S): 9 INJECTION INTRAMUSCULAR; INTRAVENOUS; SUBCUTANEOUS at 09:21

## 2022-10-07 RX ADMIN — SODIUM CHLORIDE 195 MILLILITER(S): 9 INJECTION INTRAMUSCULAR; INTRAVENOUS; SUBCUTANEOUS at 13:35

## 2022-10-07 RX ADMIN — ATENOLOL 25 MILLIGRAM(S): 25 TABLET ORAL at 21:40

## 2022-10-07 RX ADMIN — Medication 81 MILLIGRAM(S): at 09:21

## 2022-10-07 RX ADMIN — CLOPIDOGREL BISULFATE 600 MILLIGRAM(S): 75 TABLET, FILM COATED ORAL at 09:21

## 2022-10-07 RX ADMIN — PANTOPRAZOLE SODIUM 40 MILLIGRAM(S): 20 TABLET, DELAYED RELEASE ORAL at 21:38

## 2022-10-07 RX ADMIN — SODIUM CHLORIDE 500 MILLILITER(S): 9 INJECTION INTRAMUSCULAR; INTRAVENOUS; SUBCUTANEOUS at 09:22

## 2022-10-07 RX ADMIN — ATORVASTATIN CALCIUM 80 MILLIGRAM(S): 80 TABLET, FILM COATED ORAL at 21:39

## 2022-10-07 NOTE — PATIENT PROFILE ADULT - FUNCTIONAL ASSESSMENT - BASIC MOBILITY 4.
-- Message is from the [facility]--    Patient is requesting a medication refill - medication is on active medication list    Patient is currently OUT of the requested medication.  HYDROcodone-acetaminophen (NORCO)  MG per tablet 1 tablet, Oral, EVERY 6 HOURS PRN         Duration: 90 days    Pharmacy  [facility] [address] [facility] [address] [facility] [address] [address]    Patient confirmed the above pharmacy as correct?  Yes    Caller Information       Type Contact Phone    02/13/2019 01:34 PM Phone (Incoming) Molina Menon (Self) [phone] (H)          Alternative phone number: none    Turnaround time given to caller:   \"This message will be sent to [state Provider's name]. The clinical team will fulfill your request as soon as they review your message.\"   4 = No assist / stand by assistance

## 2022-10-08 ENCOUNTER — TRANSCRIPTION ENCOUNTER (OUTPATIENT)
Age: 62
End: 2022-10-08

## 2022-10-08 VITALS
SYSTOLIC BLOOD PRESSURE: 126 MMHG | OXYGEN SATURATION: 96 % | DIASTOLIC BLOOD PRESSURE: 81 MMHG | RESPIRATION RATE: 16 BRPM | HEART RATE: 56 BPM

## 2022-10-08 LAB
ANION GAP SERPL CALC-SCNC: 7 MMOL/L — SIGNIFICANT CHANGE UP (ref 5–17)
BUN SERPL-MCNC: 11 MG/DL — SIGNIFICANT CHANGE UP (ref 7–23)
CALCIUM SERPL-MCNC: 8.7 MG/DL — SIGNIFICANT CHANGE UP (ref 8.4–10.5)
CHLORIDE SERPL-SCNC: 108 MMOL/L — SIGNIFICANT CHANGE UP (ref 96–108)
CO2 SERPL-SCNC: 26 MMOL/L — SIGNIFICANT CHANGE UP (ref 22–31)
CREAT SERPL-MCNC: 1.08 MG/DL — SIGNIFICANT CHANGE UP (ref 0.5–1.3)
EGFR: 79 ML/MIN/1.73M2 — SIGNIFICANT CHANGE UP
GLUCOSE SERPL-MCNC: 125 MG/DL — HIGH (ref 70–99)
HCT VFR BLD CALC: 45.2 % — SIGNIFICANT CHANGE UP (ref 39–50)
HCV AB S/CO SERPL IA: 0.03 S/CO — SIGNIFICANT CHANGE UP
HCV AB SERPL-IMP: SIGNIFICANT CHANGE UP
HGB BLD-MCNC: 14 G/DL — SIGNIFICANT CHANGE UP (ref 13–17)
MAGNESIUM SERPL-MCNC: 1.9 MG/DL — SIGNIFICANT CHANGE UP (ref 1.6–2.6)
MCHC RBC-ENTMCNC: 24.7 PG — LOW (ref 27–34)
MCHC RBC-ENTMCNC: 31 GM/DL — LOW (ref 32–36)
MCV RBC AUTO: 79.9 FL — LOW (ref 80–100)
NRBC # BLD: 0 /100 WBCS — SIGNIFICANT CHANGE UP (ref 0–0)
PLATELET # BLD AUTO: 256 K/UL — SIGNIFICANT CHANGE UP (ref 150–400)
POTASSIUM SERPL-MCNC: 5 MMOL/L — SIGNIFICANT CHANGE UP (ref 3.5–5.3)
POTASSIUM SERPL-SCNC: 5 MMOL/L — SIGNIFICANT CHANGE UP (ref 3.5–5.3)
RBC # BLD: 5.66 M/UL — SIGNIFICANT CHANGE UP (ref 4.2–5.8)
RBC # FLD: 15.3 % — HIGH (ref 10.3–14.5)
SODIUM SERPL-SCNC: 141 MMOL/L — SIGNIFICANT CHANGE UP (ref 135–145)
WBC # BLD: 9.51 K/UL — SIGNIFICANT CHANGE UP (ref 3.8–10.5)
WBC # FLD AUTO: 9.51 K/UL — SIGNIFICANT CHANGE UP (ref 3.8–10.5)

## 2022-10-08 PROCEDURE — C1724: CPT

## 2022-10-08 PROCEDURE — 85730 THROMBOPLASTIN TIME PARTIAL: CPT

## 2022-10-08 PROCEDURE — 85610 PROTHROMBIN TIME: CPT

## 2022-10-08 PROCEDURE — 83735 ASSAY OF MAGNESIUM: CPT

## 2022-10-08 PROCEDURE — C1753: CPT

## 2022-10-08 PROCEDURE — 80048 BASIC METABOLIC PNL TOTAL CA: CPT

## 2022-10-08 PROCEDURE — C1725: CPT

## 2022-10-08 PROCEDURE — 80061 LIPID PANEL: CPT

## 2022-10-08 PROCEDURE — C1874: CPT

## 2022-10-08 PROCEDURE — 85027 COMPLETE CBC AUTOMATED: CPT

## 2022-10-08 PROCEDURE — 83036 HEMOGLOBIN GLYCOSYLATED A1C: CPT

## 2022-10-08 PROCEDURE — C1760: CPT

## 2022-10-08 PROCEDURE — 99238 HOSP IP/OBS DSCHRG MGMT 30/<: CPT

## 2022-10-08 PROCEDURE — C1769: CPT

## 2022-10-08 PROCEDURE — 86803 HEPATITIS C AB TEST: CPT

## 2022-10-08 PROCEDURE — 93005 ELECTROCARDIOGRAM TRACING: CPT

## 2022-10-08 PROCEDURE — 85025 COMPLETE CBC W/AUTO DIFF WBC: CPT

## 2022-10-08 PROCEDURE — 85347 COAGULATION TIME ACTIVATED: CPT

## 2022-10-08 PROCEDURE — C1887: CPT

## 2022-10-08 PROCEDURE — 36415 COLL VENOUS BLD VENIPUNCTURE: CPT

## 2022-10-08 PROCEDURE — 80053 COMPREHEN METABOLIC PANEL: CPT

## 2022-10-08 RX ORDER — MAGNESIUM OXIDE 400 MG ORAL TABLET 241.3 MG
400 TABLET ORAL ONCE
Refills: 0 | Status: COMPLETED | OUTPATIENT
Start: 2022-10-08 | End: 2022-10-08

## 2022-10-08 RX ORDER — ASPIRIN/CALCIUM CARB/MAGNESIUM 324 MG
1 TABLET ORAL
Qty: 0 | Refills: 0 | DISCHARGE

## 2022-10-08 RX ORDER — CLOPIDOGREL BISULFATE 75 MG/1
1 TABLET, FILM COATED ORAL
Qty: 30 | Refills: 11
Start: 2022-10-08 | End: 2023-10-02

## 2022-10-08 RX ORDER — ASPIRIN/CALCIUM CARB/MAGNESIUM 324 MG
1 TABLET ORAL
Qty: 30 | Refills: 11
Start: 2022-10-08 | End: 2023-10-02

## 2022-10-08 RX ADMIN — CLOPIDOGREL BISULFATE 75 MILLIGRAM(S): 75 TABLET, FILM COATED ORAL at 05:45

## 2022-10-08 RX ADMIN — MAGNESIUM OXIDE 400 MG ORAL TABLET 400 MILLIGRAM(S): 241.3 TABLET ORAL at 08:26

## 2022-10-08 RX ADMIN — Medication 81 MILLIGRAM(S): at 05:45

## 2022-10-08 NOTE — DISCHARGE NOTE PROVIDER - HOSPITAL COURSE
INCOMPLETE     63 y/o obese male w/ PMHx HLD, Asthma, Ulcerative Colitis (on Remacaid), and CAD (s/p recent cath, see below) who had initially presented to [facility] endorsing chest pain radiating to the jaw and arm w/ mild exertion associated w/ SOB and recently frequent dizziness w/ positional changes. Patient stated he had initially been treated for GERD w/o much relief of symptoms and that Nitroglycerin had provided some relief of symptoms. Patient denied any additional symptoms. Patient had an abnormal outpatient CCTA (09/14/2022) showing Ca score 461 Agatston units, LM < 25%, proximal LAD 50-69%, mid LAD 50-69%, proximal LCx 70-90%, OM1 normal, RCA 30-69%. Patient subsequently had CT FFR showing positive lesions in LAD and LCx. Outpatient Echocardiogram (09/06/2022) showed mild MR, normal pulmonary systolic pressure at 30 mmHg, mild LV hypertrophy, and EF 67%. Patient subsequently had a cardiac catheterization (09/28/2022) at [facility] showing proximal LAD 75% (FFR positive) and proximal LCx % (fills via L-L and R-L collaterals). In light of patient's risk factors, CCS Class III anginal symptoms, and known significant CAD by prior diagnostic angiogram, patient presented for staged intevention. Patient is now s/p staged PCI w/ LAUREN proximal LCx and IVUS guides/orbital atherectomy/LAUREN proximal LAD. Right groin access was used and Perclose arterial closure device was utilized for hemostasis.     Patient was seen and examined at bedside on 10/08/2022 and _____. Groin access site remained stable. Repeat EKG was w/o acute ischemic changes and no significant events were noted overnight on telemetry. Patient has now been medically cleared for discharge as per Dr. Lynn. Patient was given appropriate discharge instructions including medication regimen, access site management, and follow up. Any prescriptions the patient requires upon discharge have been e-prescribed to patient's preferred pharmacy.     VS Stable   Gen: NAD, A&O x3  Cards: RRR, clear S1 and S2 without murmur  Pulm: CTA B/L without w/r/r  Right Groin: No hematoma or ooze, peripheral pulses 2+ B/L  Abd: soft, NT  Ext: no LE edema or ulcerations B/L   63 y/o obese male w/ PMHx HLD, Asthma, Ulcerative Colitis (on Remacaid), and CAD (s/p recent cath, see below) who had initially presented to [facility] endorsing chest pain radiating to the jaw and arm w/ mild exertion associated w/ SOB and recently frequent dizziness w/ positional changes. Patient stated he had initially been treated for GERD w/o much relief of symptoms and that Nitroglycerin had provided some relief of symptoms. Patient denied any additional symptoms. Patient had an abnormal outpatient CCTA (09/14/2022) showing Ca score 461 Agatston units, LM < 25%, proximal LAD 50-69%, mid LAD 50-69%, proximal LCx 70-90%, OM1 normal, RCA 30-69%. Patient subsequently had CT FFR showing positive lesions in LAD and LCx. Outpatient Echocardiogram (09/06/2022) showed mild MR, normal pulmonary systolic pressure at 30 mmHg, mild LV hypertrophy, and EF 67%. Patient subsequently had a cardiac catheterization (09/28/2022) at [facility] showing proximal LAD 75% (FFR positive) and proximal LCx % (fills via L-L and R-L collaterals). In light of patient's risk factors, CCS Class III anginal symptoms, and known significant CAD by prior diagnostic angiogram, patient presented for staged intevention. Patient is now s/p staged PCI w/ LAUREN proximal LCx and IVUS guides/orbital atherectomy/LAUREN proximal LAD. Right groin access was used and Perclose arterial closure device was utilized for hemostasis.     Patient was seen and examined at bedside on 10/08/2022 and denied any complaints on exam. Groin access site remained stable. Repeat EKG was w/o acute ischemic changes and no significant events were noted overnight on telemetry. Patient has now been medically cleared for discharge as per Dr. Lynn. Patient was given appropriate discharge instructions including medication regimen, access site management, and follow up. Any prescriptions the patient requires upon discharge have been e-prescribed to patient's preferred pharmacy.     VS Stable   Gen: NAD, A&O x3  Cards: RRR, clear S1 and S2 without murmur  Pulm: CTA B/L without w/r/r  Right Groin: No hematoma or ooze, peripheral pulses 2+ B/L  Abd: soft, NT  Ext: no LE edema or ulcerations B/L           Cardiac Rehab (Post PCI):            - Education on benefits of Cardiac Rehab provided to patient: Yes         - Referral and Prescription Given for Cardiac Rehab: Yes         - Patient given list of locations & instructed to contact their insurance company to review list of participating providers    Statin Prescribed (PCI this admission): Yes  DAPT: Prescriptions for Aspirin/Plavix e-prescribed to patient's pharmacy

## 2022-10-08 NOTE — DISCHARGE NOTE NURSING/CASE MANAGEMENT/SOCIAL WORK - NSDCPEFALRISK_GEN_ALL_CORE
For information on Fall & Injury Prevention, visit: [email or web address] OR  [email or web address] OR  https://www.cdc.gov/steadi/patient.html

## 2022-10-08 NOTE — DISCHARGE NOTE NURSING/CASE MANAGEMENT/SOCIAL WORK - PATIENT PORTAL LINK FT
You can access the FollowMyHealth Patient Portal offered by [facility] by registering at the following website: [email or web address]. By joining [facility]’s FollowMyHealth portal, you will also be able to view your health information using other applications (apps) compatible with our system.

## 2022-10-08 NOTE — DISCHARGE NOTE PROVIDER - CARE PROVIDER_API CALL
Caio Bailey)  Cardiovascular Disease; Internal Medicine  [address] [place] [address]  Phone: [phone]  Fax: [phone]  Follow Up Time: 2 weeks

## 2022-10-08 NOTE — DISCHARGE NOTE PROVIDER - NSDCFUSCHEDAPPT_GEN_ALL_CORE_FT
Caio Bailey Physician [facility]  CARDIOLOGY [address]  Scheduled Appointment: 11/01/2022

## 2022-10-08 NOTE — DISCHARGE NOTE PROVIDER - NSDCCPCAREPLAN_GEN_ALL_CORE_FT
PRINCIPAL DISCHARGE DIAGNOSIS  Diagnosis: CAD (coronary artery disease)  Assessment and Plan of Treatment: You have a diagnosis of coronary artery disease and received stents to your left anterior descending coronary artery and left circumflex coronary artery. You have been started on Aspirin 81mg daily and Plavix (Clopidogrel) 75mg daily. You MUST continue taking the daily Aspirin and Plavix to ensure your stent does not close. DO NOT STOP THESE MEDICATIONS FOR ANY REASON UNLESS OTHERWISE INDICATED BY YOUR CARDIOLOGIST BECAUSE THIS WILL PUT YOU AT RISK FOR A HEART ATTACK. You should refrain from strenuous activity and heavy lifting for 1 week. Please make a follow up appointment with your cardiologist within 1-2 weeks of your discharge. All of your prescriptions have been sent electronically to your pharmacy.  The catheter from your groin was removed and bleeding was stopped with a closure device.  After 24hours you may take off the dressing and shower. Wash the site with soap and water.  There is no need to put on another bandage.  Avoid tub baths, hot tubs or swimming for 5 days.      Call the Interventional Cardiology and Vascular Team at [phone] or [phone] if any of following occur pertaining to your vascular access site: Bleeding or hematoma formation (collection of blood under the skin), drainage or redness at the puncture site, numbness, decrease in strength, coolness or pale coloration of skin of the leg or hand.       PRINCIPAL DISCHARGE DIAGNOSIS  Diagnosis: CAD (coronary artery disease)  Assessment and Plan of Treatment: You have a diagnosis of coronary artery disease and received stents to your left anterior descending coronary artery and left circumflex coronary artery. You have been started on Aspirin 81mg daily and Plavix (Clopidogrel) 75mg daily. You MUST continue taking the daily Aspirin and Plavix to ensure your stent does not close. DO NOT STOP THESE MEDICATIONS FOR ANY REASON UNLESS OTHERWISE INDICATED BY YOUR CARDIOLOGIST BECAUSE THIS WILL PUT YOU AT RISK FOR A HEART ATTACK. You should refrain from strenuous activity and heavy lifting for 1 week. Please make a follow up appointment with your cardiologist within 1-2 weeks of your discharge. All of your prescriptions have been sent electronically to your pharmacy.  The catheter from your groin was removed and bleeding was stopped with a closure device.  After 24hours you may take off the dressing and shower. Wash the site with soap and water.  There is no need to put on another bandage.  Avoid tub baths, hot tubs or swimming for 5 days.      Call the Interventional Cardiology and Vascular Team at [phone] or [phone] if any of following occur pertaining to your vascular access site: Bleeding or hematoma formation (collection of blood under the skin), drainage or redness at the puncture site, numbness, decrease in strength, coolness or pale coloration of skin of the leg or hand.      SECONDARY DISCHARGE DIAGNOSES  Diagnosis: Hyperlipidemia  Assessment and Plan of Treatment: Please continue your home medication of Crestor 20 mg daily to control your cholesterol levels.

## 2022-10-08 NOTE — DISCHARGE NOTE PROVIDER - NSDCMRMEDTOKEN_GEN_ALL_CORE_FT
Advair Diskus 250 mcg-50 mcg inhalation powder: 1 puff(s) inhaled 2 times a day  albuterol 90 mcg/inh inhalation aerosol with adapter: 2 puff(s) inhaled every 4 to 6 hours, As Needed  Allegra 30 mg oral tablet: 1 tab(s) orally once a day  Aspirin Enteric Coated 81 mg oral delayed release tablet: 1 tab(s) orally once a day  atenolol 25 mg oral tablet: 1 tab(s) orally once a day  Crestor 20 mg oral tablet: 1 tab(s) orally once a day  folic acid 1 mg oral tablet: 1 tab(s) orally once a day  nitroglycerin 0.4 mg sublingual tablet: 1 tab(s) sublingual every 5 minutes, As Needed  Protonix 40 mg oral delayed release tablet: 1 tab(s) orally once a day  Remicade 100 mg intravenous injection:   testosterone 75 mg subcutaneous implant: 4 each subcutaneous every 3 months  Vitamin D3 25 mcg (1000 intl units) oral capsule: 1 cap(s) orally once a day   Advair Diskus 250 mcg-50 mcg inhalation powder: 1 puff(s) inhaled 2 times a day  albuterol 90 mcg/inh inhalation aerosol with adapter: 2 puff(s) inhaled every 4 to 6 hours, As Needed  Allegra 30 mg oral tablet: 1 tab(s) orally once a day  Aspirin Enteric Coated 81 mg oral delayed release tablet: 1 tab(s) orally once a day  atenolol 25 mg oral tablet: 1 tab(s) orally once a day  Cardiac Rehab : Patient is referred for cardiac rehab, 3 sessions per week for 12 weeks, due to recent PCI.   Crestor 20 mg oral tablet: 1 tab(s) orally once a day  folic acid 1 mg oral tablet: 1 tab(s) orally once a day  nitroglycerin 0.4 mg sublingual tablet: 1 tab(s) sublingual every 5 minutes, As Needed  Plavix 75 mg oral tablet: 1 tab(s) orally once a day   Protonix 40 mg oral delayed release tablet: 1 tab(s) orally once a day  Remicade 100 mg intravenous injection:   testosterone 75 mg subcutaneous implant: 4 each subcutaneous every 3 months  Vitamin D3 25 mcg (1000 intl units) oral capsule: 1 cap(s) orally once a day

## 2022-10-17 DIAGNOSIS — J45.909 UNSPECIFIED ASTHMA, UNCOMPLICATED: ICD-10-CM

## 2022-10-17 DIAGNOSIS — I25.82 CHRONIC TOTAL OCCLUSION OF CORONARY ARTERY: ICD-10-CM

## 2022-10-17 DIAGNOSIS — Z79.620 LONG TERM (CURRENT) USE OF IMMUNOSUPPRESSIVE BIOLOGIC: ICD-10-CM

## 2022-10-17 DIAGNOSIS — E78.5 HYPERLIPIDEMIA, UNSPECIFIED: ICD-10-CM

## 2022-10-17 DIAGNOSIS — I25.10 ATHEROSCLEROTIC HEART DISEASE OF NATIVE CORONARY ARTERY WITHOUT ANGINA PECTORIS: ICD-10-CM

## 2022-10-17 DIAGNOSIS — I25.84 CORONARY ATHEROSCLEROSIS DUE TO CALCIFIED CORONARY LESION: ICD-10-CM

## 2022-10-17 DIAGNOSIS — K51.90 ULCERATIVE COLITIS, UNSPECIFIED, WITHOUT COMPLICATIONS: ICD-10-CM

## 2022-10-17 DIAGNOSIS — Z88.8 ALLERGY STATUS TO OTHER DRUGS, MEDICAMENTS AND BIOLOGICAL SUBSTANCES STATUS: ICD-10-CM

## 2022-10-17 DIAGNOSIS — E66.9 OBESITY, UNSPECIFIED: ICD-10-CM

## 2022-10-17 DIAGNOSIS — I34.0 NONRHEUMATIC MITRAL (VALVE) INSUFFICIENCY: ICD-10-CM

## 2022-10-17 DIAGNOSIS — I25.119 ATHEROSCLEROTIC HEART DISEASE OF NATIVE CORONARY ARTERY WITH UNSPECIFIED ANGINA PECTORIS: ICD-10-CM

## 2022-10-17 DIAGNOSIS — Z79.899 OTHER LONG TERM (CURRENT) DRUG THERAPY: ICD-10-CM

## 2022-10-17 DIAGNOSIS — Z79.82 LONG TERM (CURRENT) USE OF ASPIRIN: ICD-10-CM

## 2022-11-01 ENCOUNTER — APPOINTMENT (OUTPATIENT)
Dept: CARDIOLOGY | Facility: CLINIC | Age: 62
End: 2022-11-01

## 2022-11-01 VITALS
WEIGHT: 213 LBS | SYSTOLIC BLOOD PRESSURE: 128 MMHG | DIASTOLIC BLOOD PRESSURE: 70 MMHG | HEIGHT: 67 IN | HEART RATE: 81 BPM | OXYGEN SATURATION: 96 % | BODY MASS INDEX: 33.43 KG/M2

## 2022-11-01 DIAGNOSIS — Z86.79 PERSONAL HISTORY OF OTHER DISEASES OF THE CIRCULATORY SYSTEM: ICD-10-CM

## 2022-11-01 PROCEDURE — 99213 OFFICE O/P EST LOW 20 MIN: CPT | Mod: 25

## 2022-11-01 PROCEDURE — 93000 ELECTROCARDIOGRAM COMPLETE: CPT

## 2022-11-02 ENCOUNTER — NON-APPOINTMENT (OUTPATIENT)
Age: 62
End: 2022-11-02

## 2022-11-02 PROBLEM — J45.909 UNSPECIFIED ASTHMA, UNCOMPLICATED: Chronic | Status: ACTIVE | Noted: 2022-10-07

## 2022-11-02 PROBLEM — K51.90 ULCERATIVE COLITIS, UNSPECIFIED, WITHOUT COMPLICATIONS: Chronic | Status: ACTIVE | Noted: 2022-10-07

## 2022-11-02 PROBLEM — I25.10 ATHEROSCLEROTIC HEART DISEASE OF NATIVE CORONARY ARTERY WITHOUT ANGINA PECTORIS: Chronic | Status: ACTIVE | Noted: 2022-10-07

## 2022-11-02 PROBLEM — Z86.79 HISTORY OF CORONARY ATHEROSCLEROSIS: Status: RESOLVED | Noted: 2022-09-11 | Resolved: 2022-11-02

## 2022-11-02 PROBLEM — E78.5 HYPERLIPIDEMIA, UNSPECIFIED: Chronic | Status: ACTIVE | Noted: 2022-10-07

## 2022-11-02 RX ORDER — ASPIRIN 81 MG
81 TABLET, DELAYED RELEASE (ENTERIC COATED) ORAL
Refills: 0 | Status: ACTIVE | COMMUNITY

## 2022-11-02 RX ORDER — METHYLPREDNISOLONE SODIUM SUCCINATE 500 MG/ML
INJECTION, POWDER, FOR SOLUTION INTRAMUSCULAR; INTRAVENOUS
Refills: 0 | Status: ACTIVE | COMMUNITY

## 2022-11-02 NOTE — DISCUSSION/SUMMARY
[FreeTextEntry1] : Atherosclerotic heart disease\par . Atherosclerotic heart disease is stable  The 9/22 CT coronary angiogram demonstrated  a less than 25% left main stenosis. The LAD proximal 50-69% mid  50-69% left circumflex proximal 50-69% large OM1 patent RCA 30-69%. were seen  FFR measurements revealed compromised coronary blood flow involving the left anterior descending artery and circumflex. \par \par Coronary arteriography at [facility] in 9/22 revealed proximal LAD 75% ( FFR positive) and proximal left circumflex % ( fills via left to left and right-to-left collaterals). In 10/22 he underwent staged PCI/LAUREN proximal left circumflex and LUZ guided atherectomy/LAUREN proximal LAD.\par The  resting 11/22 electrocardiogram shows no evidence of myocardial ischemia or infarction.\par \par . The 9/22 echocardiogram revealed normal left ventricular end-diastolic dimension of 5.5 cm the left ventricle ejection fraction of 67%.. Measures to control modifiable risk factors for the development of atherosclerotic disease will be important in long-term management.\par \par I have recommended following\par a. Risk factor modification\par b.. Cardiac rehabilitation program  \par c  Continue the present medical regimen\par d. No further cardiac testing for this problem at this time\par \par \par \par \par Hyperlipidemia\par Hyperlipidemia represents a risk factor for progressive atherosclerotic heart disease. In the setting of known coronary disease the target LDL level is about 70. Prior to medical intervention in 9/22 the serum cholesterol level was 204 HDL 33 triglycerides 249 and .\par HMG-CoA reductase inhibitor therapy has been effective. In 9/22 the serum cholesterol level was 144 triglycerides 238 HDL 36 and LDL 60 The dose of rosuvastatin may be increased if required to maintain optimal levels. Nonpharmacological therapy, specifically diet exercise and weight loss are emphasized as major aspects of treatment.\par \par I have recommended following \par a. Low-fat low-cholesterol low caloric diet. Regular aerobic exercise and weight loss\par b. Continue the present medical regimen\par c. Target LDL level to about 70 as discussed above\par d. Increase rosuvastatin dose if required to  maintain  optimal levels as noted above.\par \par \par \par Obesity\par Obesity exacerbates  Caio's  cardiovascular issues. Today Mr. Ko is 5 feet 7 inches tall and weighs 213   pounds. Diet exercise and weight loss are advised \par \par \par \par The diagnosis, prognosis, risks, options and alternatives were explained at length to the patient. All questions were answered. Issues discussed included  coronary arteriography revascularization procedures   atherosclerotic  heart disease  hyperlipidemia noninvasive cardiac studies obesity coronary arteriography diet and exercise.\par \par Counseling and will coordination of care\par Time was a significant factor for this patient encounter. Total time spent with the patient was  25  minutes. Greater than 50% of the time was devoted to counseling and/or  coordination of care

## 2022-11-02 NOTE — PHYSICAL EXAM
[Normal Conjunctiva] : normal conjunctiva [Normal S1, S2] : normal S1, S2 [Clear Lung Fields] : clear lung fields [Soft] : abdomen soft [Non Tender] : non-tender [Normal Bowel Sounds] : normal bowel sounds [Normal Gait] : normal gait [No Rash] : no rash [No Focal Deficits] : no focal deficits [de-identified] : appears in no distress lying flat [de-identified] : normocephalic [de-identified] : no carotid bruit. No jugular venous distention [de-identified] : no murmur. No gallop. No diastolic sounds. [de-identified] : full range of motion [de-identified] : no peripheral edema. Dorsalis pedis pulse +2 bilaterally. Feet warm and well-perfused. No ulcerations. [de-identified] : pleasant

## 2022-11-02 NOTE — HISTORY OF PRESENT ILLNESS
[FreeTextEntry1] : 62-year-old man\par Routine follow up out of hospitall\par \par Caio underwent coronary arteriography at [facility] which revealed proximal 75% (FFR positive) and proximal circumflex % ( filling via left to left and right-to-left collaterals)  Iin 10/22 he underwent staged PCI/LAUREN proximal left circumflex and atherectomy/LAUREN proximal LAD at [facility]  with excellent angiographic result. (See Hospital medical records)\par \par Mr. Zheng denies exertional chest pain, shortness of breath palpitations or syncope.\par He is undergoing cardiac rehabilitation program

## 2022-11-30 RX ORDER — ATENOLOL 25 MG/1
25 TABLET ORAL
Qty: 90 | Refills: 3 | Status: ACTIVE | COMMUNITY
Start: 2022-07-27

## 2023-04-02 ENCOUNTER — NON-APPOINTMENT (OUTPATIENT)
Age: 63
End: 2023-04-02

## 2023-04-18 ENCOUNTER — TRANSCRIPTION ENCOUNTER (OUTPATIENT)
Age: 63
End: 2023-04-18

## 2023-06-02 ENCOUNTER — NON-APPOINTMENT (OUTPATIENT)
Age: 63
End: 2023-06-02

## 2023-06-02 ENCOUNTER — APPOINTMENT (OUTPATIENT)
Dept: CARDIOLOGY | Facility: CLINIC | Age: 63
End: 2023-06-02
Payer: COMMERCIAL

## 2023-06-02 VITALS
OXYGEN SATURATION: 95 % | SYSTOLIC BLOOD PRESSURE: 147 MMHG | WEIGHT: 208 LBS | HEART RATE: 64 BPM | DIASTOLIC BLOOD PRESSURE: 82 MMHG | BODY MASS INDEX: 32.65 KG/M2 | HEIGHT: 67 IN

## 2023-06-02 PROCEDURE — 99213 OFFICE O/P EST LOW 20 MIN: CPT | Mod: 25

## 2023-06-02 PROCEDURE — 93000 ELECTROCARDIOGRAM COMPLETE: CPT

## 2023-06-03 NOTE — HISTORY OF PRESENT ILLNESS
[FreeTextEntry1] : 63-year-old man\par Routine follow-up\par \par "I feel good."  Caio is exercising regularly treadmill bicycle and elliptical without any restriction or limitation.  No palpitations.  No shortness of breath.  No syncope.  He continues to struggle with his weight

## 2023-06-03 NOTE — DISCUSSION/SUMMARY
[FreeTextEntry1] : Atherosclerotic heart disease\par . Atherosclerotic heart disease is stable  The 9/22 CT coronary angiogram demonstrated  a less than 25% left main stenosis. The LAD proximal 50-69% mid  50-69% left circumflex proximal 50-69% large OM1 patent RCA 30-69%. were seen  FFR measurements revealed compromised coronary blood flow involving the left anterior descending artery and circumflex. \par \par Coronary arteriography at [facility] in 9/22 revealed proximal LAD 75% ( FFR positive) and proximal left circumflex % ( fills via left to left and right-to-left collaterals). In 10/22 he underwent staged PCI/LAUREN proximal left circumflex and LUZ guided atherectomy/LAUREN proximal LAD.\par The  resting 6/23  electrocardiogram shows no evidence of myocardial ischemia or infarction.\par \par . The 9/22 echocardiogram revealed normal left ventricular end-diastolic dimension of 5.5 cm the left ventricle ejection fraction of 67%.. Measures to control modifiable risk factors for the development of atherosclerotic disease will be important in long-term management.\par \par I have recommended following\par a. Risk factor modification\par b..  Continue the present medical regimen\par c  No further cardiac testing for this problem at this time\par \par \par \par \par Hyperlipidemia\par Hyperlipidemia represents a risk factor for progressive atherosclerotic heart disease. In the setting of known coronary disease the target LDL level is about 70. Prior to medical intervention in 9/22 the serum cholesterol level was 204 HDL 33 triglycerides 249 and .\par HMG-CoA reductase inhibitor therapy has been effective. In 9/22 the serum cholesterol level was 144 triglycerides 238 HDL 36 and LDL 60 The dose of rosuvastatin may be increased if required to maintain optimal levels. Nonpharmacological therapy, specifically diet exercise and weight loss are emphasized as major aspects of treatment.\par \par I have recommended following \par a. Low-fat low-cholesterol low caloric diet. Regular aerobic exercise and weight loss\par b. Continue the present medical regimen\par c. Target LDL level to about 70 as discussed above\par d. Increase rosuvastatin dose if required to  maintain  optimal levels as noted above.\par \par \par \par Obesity\par Obesity exacerbates  Caio's  cardiovascular issues. Today Mr. Ko is 5 feet 7 inches tall and weighs 208    pounds.  He has lost 5 pounds in the last 6 months.  Diet exercise and weight loss are advised \par \par \par \par The diagnosis, prognosis, risks, options and alternatives were explained at length to the patient. All questions were answered. Issues discussed included  coronary arteriography revascularization procedures   atherosclerotic  heart disease  hyperlipidemia noninvasive cardiac studies obesity coronary arteriography diet and exercise.\par \par Counseling and will coordination of care\par Time was a significant factor for this patient encounter. Total time spent with the patient was  25  minutes. Greater than 50% of the time was devoted to counseling and/or  coordination of care

## 2023-06-03 NOTE — PHYSICAL EXAM
[Normal Conjunctiva] : normal conjunctiva [Normal S1, S2] : normal S1, S2 [Clear Lung Fields] : clear lung fields [Soft] : abdomen soft [Non Tender] : non-tender [Normal Bowel Sounds] : normal bowel sounds [Normal Gait] : normal gait [No Rash] : no rash [No Focal Deficits] : no focal deficits [de-identified] : appears in no distress lying flat [de-identified] : no carotid bruit. No jugular venous distention [de-identified] : normocephalic [de-identified] : no murmur. No gallop. No diastolic sounds. [de-identified] : no peripheral edema. Dorsalis pedis pulse +2 bilaterally. Feet warm and well-perfused. No ulcerations. [de-identified] : full range of motion [de-identified] : pleasant

## 2023-10-23 ENCOUNTER — NON-APPOINTMENT (OUTPATIENT)
Age: 63
End: 2023-10-23

## 2023-12-01 ENCOUNTER — NON-APPOINTMENT (OUTPATIENT)
Age: 63
End: 2023-12-01

## 2023-12-01 ENCOUNTER — APPOINTMENT (OUTPATIENT)
Dept: CARDIOLOGY | Facility: CLINIC | Age: 63
End: 2023-12-01
Payer: COMMERCIAL

## 2023-12-01 VITALS
BODY MASS INDEX: 32.42 KG/M2 | SYSTOLIC BLOOD PRESSURE: 135 MMHG | DIASTOLIC BLOOD PRESSURE: 76 MMHG | OXYGEN SATURATION: 97 % | HEART RATE: 83 BPM | WEIGHT: 207 LBS

## 2023-12-01 PROCEDURE — 93000 ELECTROCARDIOGRAM COMPLETE: CPT | Mod: NC

## 2023-12-01 PROCEDURE — 99213 OFFICE O/P EST LOW 20 MIN: CPT | Mod: 25

## 2023-12-01 RX ORDER — FEXOFENADINE HCL 180 MG
180 TABLET ORAL
Refills: 0 | Status: ACTIVE | COMMUNITY

## 2024-05-02 ENCOUNTER — TRANSCRIPTION ENCOUNTER (OUTPATIENT)
Age: 64
End: 2024-05-02

## 2024-05-03 RX ORDER — CLOPIDOGREL BISULFATE 75 MG/1
75 TABLET, FILM COATED ORAL
Qty: 90 | Refills: 3 | Status: ACTIVE | COMMUNITY

## 2024-05-03 RX ORDER — ASPIRIN ENTERIC COATED TABLETS 81 MG 81 MG/1
81 TABLET, DELAYED RELEASE ORAL
Qty: 90 | Refills: 3 | Status: ACTIVE | COMMUNITY
Start: 2024-05-02

## 2024-05-03 RX ORDER — ATENOLOL 25 MG/1
25 TABLET ORAL
Qty: 90 | Refills: 3 | Status: ACTIVE | COMMUNITY
Start: 2023-12-05

## 2024-05-03 RX ORDER — ROSUVASTATIN CALCIUM 20 MG/1
20 TABLET, FILM COATED ORAL
Qty: 90 | Refills: 3 | Status: ACTIVE | COMMUNITY
Start: 2023-01-10

## 2024-05-31 ENCOUNTER — APPOINTMENT (OUTPATIENT)
Dept: CARDIOLOGY | Facility: CLINIC | Age: 64
End: 2024-05-31
Payer: COMMERCIAL

## 2024-05-31 ENCOUNTER — NON-APPOINTMENT (OUTPATIENT)
Age: 64
End: 2024-05-31

## 2024-05-31 VITALS
BODY MASS INDEX: 32.58 KG/M2 | OXYGEN SATURATION: 98 % | SYSTOLIC BLOOD PRESSURE: 122 MMHG | HEART RATE: 67 BPM | WEIGHT: 208 LBS | DIASTOLIC BLOOD PRESSURE: 76 MMHG

## 2024-05-31 DIAGNOSIS — E66.9 OBESITY, UNSPECIFIED: ICD-10-CM

## 2024-05-31 DIAGNOSIS — E78.5 HYPERLIPIDEMIA, UNSPECIFIED: ICD-10-CM

## 2024-05-31 DIAGNOSIS — I25.10 ATHEROSCLEROTIC HEART DISEASE OF NATIVE CORONARY ARTERY W/OUT ANGINA PECTORIS: ICD-10-CM

## 2024-05-31 PROCEDURE — 93000 ELECTROCARDIOGRAM COMPLETE: CPT

## 2024-05-31 PROCEDURE — 99213 OFFICE O/P EST LOW 20 MIN: CPT | Mod: 25

## 2024-06-01 PROBLEM — I25.10 ATHEROSCLEROTIC HEART DISEASE: Status: ACTIVE | Noted: 2022-07-27

## 2024-06-01 PROBLEM — E66.9 OBESITY: Status: ACTIVE | Noted: 2022-07-27

## 2024-06-01 PROBLEM — E78.5 HYPERLIPIDEMIA: Status: ACTIVE | Noted: 2022-09-02

## 2024-06-01 NOTE — PHYSICAL EXAM
[Normal Conjunctiva] : normal conjunctiva [Normal S1, S2] : normal S1, S2 [Clear Lung Fields] : clear lung fields [Soft] : abdomen soft [Non Tender] : non-tender [Normal Bowel Sounds] : normal bowel sounds [Normal Gait] : normal gait [No Rash] : no rash [No Focal Deficits] : no focal deficits [de-identified] : normocephalic [de-identified] : appears in no distress lying flat [de-identified] : no carotid bruit. No jugular venous distention [de-identified] : no murmur. No gallop. No diastolic sounds. [de-identified] : full range of motion [de-identified] : no peripheral edema. Dorsalis pedis pulse +2 bilaterally. Feet warm and well-perfused. No ulcerations. [de-identified] : pleasant

## 2024-06-01 NOTE — DISCUSSION/SUMMARY
[FreeTextEntry1] :  Atherosclerotic heart disease . Atherosclerotic heart disease is stable  The 9/22 CT coronary angiogram demonstrated  a less than 25% left main stenosis. The LAD proximal 50-69% mid  50-69% left circumflex proximal 50-69% large OM1 patent RCA 30-69%. were seen  FFR measurements revealed compromised coronary blood flow involving the left anterior descending artery and circumflex.   Coronary arteriography at [facility] in 9/22 revealed proximal LAD 75% ( FFR positive) and proximal left circumflex % ( fills via left to left and right-to-left collaterals). In 10/22 he underwent staged PCI/LAUREN proximal left circumflex and LUZ guided atherectomy/LAUREN proximal LAD. The  resting 5/24  electrocardiogram shows no evidence of myocardial ischemia or infarction.  . The 9/22 echocardiogram revealed normal left ventricular end-diastolic dimension of 5.5 cm the left ventricle ejection fraction of 67%.. Discontinuation  of dual antiplatelet therapy with removal of Plavix (or aspirin) may safely be done at this time now greater than 1 year following the stenting procedures.  However in the absence of any bleeding complications given multiple stents ,  in my judgment continuing the present medical therapy benefit outweighs its potential bleeding risk.  Measures to control modifiable risk factors for the development of atherosclerotic disease will be important in long-term management.  I have recommended following a. Risk factor modification b..  Continue the present medical regimen c  No further cardiac testing for this problem at this time     Hyperlipidemia Hyperlipidemia represents a risk factor for progressive atherosclerotic heart disease. In the setting of known coronary disease the target LDL level is about 70. Prior to medical intervention in 9/22 the serum cholesterol level was 204 HDL 33 triglycerides 249 and . HMG-CoA reductase inhibitor therapy has been effective. In 9/22 the serum cholesterol level was 144 triglycerides 238 HDL 36 and LDL 60.  More recent lipid levels are not available for review. The dose of rosuvastatin may be increased if required to maintain optimal levels. Nonpharmacological therapy, specifically diet exercise and weight loss are emphasized as major aspects of treatment.  I have recommended following  a. Low-fat low-cholesterol low caloric diet. Regular aerobic exercise and weight loss b. Continue the present medical regimen c. Target LDL level to about 70 as discussed above d. Increase rosuvastatin dose if required to  maintain  optimal levels as noted above. e Routine laboratory studies including lipid profile through primary care Dr. Gonzalez    Obesity Obesity exacerbates  Caio's  cardiovascular issues. Today Mr. Ko is 5 feet 7 inches tall and weighs 208    pounds.  .  Diet exercise and weight loss are advised     The diagnosis, prognosis, risks, options and alternatives were explained at length to the patient. All questions were answered. Issues discussed included   coronary arteriography revascularization procedures   atherosclerotic  heart disease  hyperlipidemia noninvasive cardiac studies obesity coronary arteriography diet and exercise.  Counseling and will coordination of care Time was a significant factor for this patient encounter. Total time spent with the patient was  25  minutes. Greater than 50% of the time was devoted to counseling and/or  coordination of care

## 2024-06-01 NOTE — HISTORY OF PRESENT ILLNESS
[FreeTextEntry1] : 64-year-old man Routine follow-up for atherosclerotic heart disease hyperlipidemia obesity  Caio states that he feels well.  He is physically active exercising regularly on a treadmill and elliptical without restriction or limitation.  He specifically denies chest pain, shortness of breath, palpitations or syncope.

## 2024-11-25 ENCOUNTER — NON-APPOINTMENT (OUTPATIENT)
Age: 64
End: 2024-11-25

## 2024-11-25 ENCOUNTER — APPOINTMENT (OUTPATIENT)
Dept: CARDIOLOGY | Facility: CLINIC | Age: 64
End: 2024-11-25
Payer: COMMERCIAL

## 2024-11-25 VITALS
BODY MASS INDEX: 31.79 KG/M2 | HEART RATE: 75 BPM | SYSTOLIC BLOOD PRESSURE: 153 MMHG | DIASTOLIC BLOOD PRESSURE: 73 MMHG | OXYGEN SATURATION: 98 % | WEIGHT: 203 LBS

## 2024-11-25 DIAGNOSIS — E78.5 HYPERLIPIDEMIA, UNSPECIFIED: ICD-10-CM

## 2024-11-25 DIAGNOSIS — E66.9 OBESITY, UNSPECIFIED: ICD-10-CM

## 2024-11-25 DIAGNOSIS — I25.10 ATHEROSCLEROTIC HEART DISEASE OF NATIVE CORONARY ARTERY W/OUT ANGINA PECTORIS: ICD-10-CM

## 2024-11-25 DIAGNOSIS — Z86.03 PERSONAL HISTORY OF NEOPLASM OF UNCERTAIN BEHAVIOR: ICD-10-CM

## 2024-11-25 PROCEDURE — 99213 OFFICE O/P EST LOW 20 MIN: CPT | Mod: 25

## 2024-11-25 PROCEDURE — 93000 ELECTROCARDIOGRAM COMPLETE: CPT

## 2024-11-26 ENCOUNTER — NON-APPOINTMENT (OUTPATIENT)
Age: 64
End: 2024-11-26

## 2024-11-26 PROBLEM — Z86.03 HISTORY OF POLYCYTHEMIA VERA: Status: RESOLVED | Noted: 2024-11-26 | Resolved: 2024-11-26

## 2024-11-26 RX ORDER — RISANKIZUMAB-RZAA 180 MG/1.2
WEARABLE INJECTOR SUBCUTANEOUS
Refills: 0 | Status: ACTIVE | COMMUNITY

## 2025-04-09 ENCOUNTER — RX RENEWAL (OUTPATIENT)
Age: 65
End: 2025-04-09

## 2025-04-09 RX ORDER — ASPIRIN 81 MG/1
81 TABLET, COATED ORAL
Qty: 90 | Refills: 3 | Status: ACTIVE | COMMUNITY
Start: 2025-04-09

## 2025-07-17 ENCOUNTER — NON-APPOINTMENT (OUTPATIENT)
Age: 65
End: 2025-07-17

## 2025-07-17 ENCOUNTER — RESULT REVIEW (OUTPATIENT)
Age: 65
End: 2025-07-17

## 2025-07-23 ENCOUNTER — NON-APPOINTMENT (OUTPATIENT)
Age: 65
End: 2025-07-23

## 2025-07-25 ENCOUNTER — APPOINTMENT (OUTPATIENT)
Dept: CARDIOLOGY | Facility: CLINIC | Age: 65
End: 2025-07-25
Payer: COMMERCIAL

## 2025-07-25 VITALS
BODY MASS INDEX: 32.73 KG/M2 | OXYGEN SATURATION: 97 % | SYSTOLIC BLOOD PRESSURE: 146 MMHG | WEIGHT: 209 LBS | DIASTOLIC BLOOD PRESSURE: 77 MMHG | HEART RATE: 64 BPM

## 2025-07-25 DIAGNOSIS — E66.9 OBESITY, UNSPECIFIED: ICD-10-CM

## 2025-07-25 DIAGNOSIS — Z86.79 PERSONAL HISTORY OF OTHER DISEASES OF THE CIRCULATORY SYSTEM: ICD-10-CM

## 2025-07-25 DIAGNOSIS — I25.10 ATHEROSCLEROTIC HEART DISEASE OF NATIVE CORONARY ARTERY W/OUT ANGINA PECTORIS: ICD-10-CM

## 2025-07-25 DIAGNOSIS — E78.5 HYPERLIPIDEMIA, UNSPECIFIED: ICD-10-CM

## 2025-07-25 DIAGNOSIS — R76.8 OTHER SPECIFIED ABNORMAL IMMUNOLOGICAL FINDINGS IN SERUM: ICD-10-CM

## 2025-07-25 PROCEDURE — 93000 ELECTROCARDIOGRAM COMPLETE: CPT

## 2025-07-25 PROCEDURE — 99213 OFFICE O/P EST LOW 20 MIN: CPT | Mod: 25

## 2025-07-26 ENCOUNTER — NON-APPOINTMENT (OUTPATIENT)
Age: 65
End: 2025-07-26

## 2025-07-26 PROBLEM — Z86.79 HISTORY OF CORONARY ATHEROSCLEROSIS: Status: RESOLVED | Noted: 2022-09-11 | Resolved: 2025-07-26

## 2025-07-26 PROBLEM — R76.8 ANA POSITIVE: Status: RESOLVED | Noted: 2025-07-26 | Resolved: 2025-07-26

## 2025-07-31 ENCOUNTER — NON-APPOINTMENT (OUTPATIENT)
Age: 65
End: 2025-07-31

## 2025-08-01 ENCOUNTER — APPOINTMENT (OUTPATIENT)
Dept: HEMATOLOGY ONCOLOGY | Facility: CLINIC | Age: 65
End: 2025-08-01
Payer: COMMERCIAL

## 2025-08-01 ENCOUNTER — RESULT REVIEW (OUTPATIENT)
Age: 65
End: 2025-08-01

## 2025-08-01 VITALS
DIASTOLIC BLOOD PRESSURE: 73 MMHG | WEIGHT: 205.44 LBS | RESPIRATION RATE: 16 BRPM | TEMPERATURE: 97.5 F | SYSTOLIC BLOOD PRESSURE: 135 MMHG | OXYGEN SATURATION: 96 % | HEIGHT: 68 IN | HEART RATE: 60 BPM | BODY MASS INDEX: 31.14 KG/M2

## 2025-08-01 DIAGNOSIS — K51.90 ULCERATIVE COLITIS, UNSPECIFIED, W/OUT COMPLICATIONS: ICD-10-CM

## 2025-08-01 PROCEDURE — 99205 OFFICE O/P NEW HI 60 MIN: CPT

## 2025-08-01 RX ORDER — EFINACONAZOLE 100 MG/ML
10 SOLUTION TOPICAL
Refills: 0 | Status: ACTIVE | COMMUNITY

## 2025-08-01 RX ORDER — FOLIC ACID 1 MG/1
1 TABLET ORAL
Refills: 0 | Status: ACTIVE | COMMUNITY

## 2025-08-01 RX ORDER — ITRACONAZOLE 100 MG/1
100 CAPSULE, COATED PELLETS ORAL
Refills: 0 | Status: ACTIVE | COMMUNITY

## 2025-08-01 RX ORDER — METFORMIN HYDROCHLORIDE 500 MG/1
500 TABLET, COATED ORAL
Refills: 0 | Status: ACTIVE | COMMUNITY

## 2025-08-04 LAB
BASOPHILS # BLD AUTO: 0.04 K/UL
BASOPHILS NFR BLD AUTO: 0.4 %
EOSINOPHIL # BLD AUTO: 0.1 K/UL
EOSINOPHIL NFR BLD AUTO: 1.1 %
HCT VFR BLD CALC: 62.4 %
HGB BLD-MCNC: 17.8 G/DL
IMM GRANULOCYTES NFR BLD AUTO: 0.4 %
LYMPHOCYTES # BLD AUTO: 2.61 K/UL
LYMPHOCYTES NFR BLD AUTO: 27.9 %
MAN DIFF?: NORMAL
MCHC RBC-ENTMCNC: 28.5 G/DL
MCHC RBC-ENTMCNC: 29.1 PG
MCV RBC AUTO: 102 FL
MONOCYTES # BLD AUTO: 0.55 K/UL
MONOCYTES NFR BLD AUTO: 5.9 %
NEUTROPHILS # BLD AUTO: 6.03 K/UL
NEUTROPHILS NFR BLD AUTO: 64.3 %
PLATELET # BLD AUTO: 241 K/UL
RBC # BLD: 6.12 M/UL
RBC # FLD: 17.9 %
WBC # FLD AUTO: 9.37 K/UL

## 2025-08-06 LAB
GENE XXX MUT ANL BLD/T: NORMAL
MPL EXON 10 MUTATION: NORMAL

## 2025-08-08 LAB — JAK2 EXONS 12-15 MUTATION ANALYSIS: NORMAL

## 2025-08-11 ENCOUNTER — APPOINTMENT (OUTPATIENT)
Dept: PULMONOLOGY | Facility: CLINIC | Age: 65
End: 2025-08-11
Payer: COMMERCIAL

## 2025-08-11 VITALS
HEIGHT: 68 IN | HEART RATE: 80 BPM | DIASTOLIC BLOOD PRESSURE: 80 MMHG | BODY MASS INDEX: 30.16 KG/M2 | WEIGHT: 199 LBS | SYSTOLIC BLOOD PRESSURE: 120 MMHG | OXYGEN SATURATION: 97 %

## 2025-08-11 PROBLEM — G47.9 SLEEP DISORDER: Status: ACTIVE | Noted: 2025-08-11

## 2025-08-11 PROBLEM — D75.1 ERYTHROCYTOSIS: Status: ACTIVE | Noted: 2025-08-01

## 2025-08-11 PROCEDURE — 99204 OFFICE O/P NEW MOD 45 MIN: CPT

## 2025-08-19 DIAGNOSIS — G47.9 SLEEP DISORDER, UNSPECIFIED: ICD-10-CM

## 2025-08-19 DIAGNOSIS — D75.1 SECONDARY POLYCYTHEMIA: ICD-10-CM

## 2025-08-20 ENCOUNTER — APPOINTMENT (OUTPATIENT)
Dept: HEMATOLOGY ONCOLOGY | Facility: CLINIC | Age: 65
End: 2025-08-20
Payer: COMMERCIAL

## 2025-08-20 ENCOUNTER — RESULT REVIEW (OUTPATIENT)
Age: 65
End: 2025-08-20

## 2025-08-20 VITALS
OXYGEN SATURATION: 97 % | SYSTOLIC BLOOD PRESSURE: 117 MMHG | HEART RATE: 68 BPM | HEIGHT: 68 IN | BODY MASS INDEX: 30.77 KG/M2 | WEIGHT: 203 LBS | DIASTOLIC BLOOD PRESSURE: 67 MMHG | RESPIRATION RATE: 16 BRPM | TEMPERATURE: 97 F

## 2025-08-20 PROCEDURE — 99214 OFFICE O/P EST MOD 30 MIN: CPT

## 2025-08-20 RX ORDER — QUININE HCL DIHYDRATE 100 %
POWDER (GRAM) MISCELLANEOUS
Refills: 0 | Status: ACTIVE | COMMUNITY

## 2025-08-27 ENCOUNTER — APPOINTMENT (OUTPATIENT)
Dept: NEUROLOGY | Facility: CLINIC | Age: 65
End: 2025-08-27

## 2025-08-27 VITALS
HEIGHT: 68 IN | SYSTOLIC BLOOD PRESSURE: 128 MMHG | DIASTOLIC BLOOD PRESSURE: 78 MMHG | BODY MASS INDEX: 30.31 KG/M2 | HEART RATE: 67 BPM | WEIGHT: 200 LBS | OXYGEN SATURATION: 97 %

## 2025-08-27 DIAGNOSIS — R51.9 HEADACHE, UNSPECIFIED: ICD-10-CM

## 2025-08-27 DIAGNOSIS — G43.009 MIGRAINE W/OUT AURA, NOT INTRACTABLE, W/OUT STATUS MIGRAINOSUS: ICD-10-CM

## 2025-08-27 PROCEDURE — 99205 OFFICE O/P NEW HI 60 MIN: CPT

## 2025-08-27 RX ORDER — NORTRIPTYLINE HYDROCHLORIDE 10 MG/1
10 CAPSULE ORAL
Qty: 30 | Refills: 3 | Status: ACTIVE | COMMUNITY
Start: 2025-08-27 | End: 1900-01-01

## 2025-09-03 RX ORDER — RIMEGEPANT SULFATE 75 MG/75MG
75 TABLET, ORALLY DISINTEGRATING ORAL
Qty: 8 | Refills: 5 | Status: ACTIVE | COMMUNITY
Start: 2025-08-27